# Patient Record
Sex: FEMALE | Race: WHITE | Employment: FULL TIME | URBAN - METROPOLITAN AREA
[De-identification: names, ages, dates, MRNs, and addresses within clinical notes are randomized per-mention and may not be internally consistent; named-entity substitution may affect disease eponyms.]

---

## 2017-02-02 ENCOUNTER — TELEPHONE (OUTPATIENT)
Dept: FAMILY MEDICINE CLINIC | Age: 52
End: 2017-02-02

## 2017-06-06 RX ORDER — VENLAFAXINE HYDROCHLORIDE 150 MG/1
150 CAPSULE, EXTENDED RELEASE ORAL DAILY
Qty: 30 CAPSULE | Refills: 0 | Status: SHIPPED | OUTPATIENT
Start: 2017-06-06 | End: 2017-07-27

## 2017-07-19 ENCOUNTER — OFFICE VISIT (OUTPATIENT)
Dept: FAMILY MEDICINE CLINIC | Age: 52
End: 2017-07-19

## 2017-07-19 VITALS
WEIGHT: 236.8 LBS | SYSTOLIC BLOOD PRESSURE: 126 MMHG | HEART RATE: 102 BPM | OXYGEN SATURATION: 97 % | RESPIRATION RATE: 16 BRPM | BODY MASS INDEX: 38.06 KG/M2 | DIASTOLIC BLOOD PRESSURE: 80 MMHG | HEIGHT: 66 IN

## 2017-07-19 DIAGNOSIS — F43.21 ADJUSTMENT DISORDER WITH DEPRESSED MOOD: Primary | ICD-10-CM

## 2017-07-19 DIAGNOSIS — Z12.39 BREAST CANCER SCREENING: ICD-10-CM

## 2017-07-19 DIAGNOSIS — E66.01 MORBID OBESITY DUE TO EXCESS CALORIES (HCC): ICD-10-CM

## 2017-07-19 PROCEDURE — 99202 OFFICE O/P NEW SF 15 MIN: CPT | Performed by: FAMILY MEDICINE

## 2017-07-19 RX ORDER — VENLAFAXINE HYDROCHLORIDE 150 MG/1
150 CAPSULE, EXTENDED RELEASE ORAL DAILY
Qty: 30 CAPSULE | Refills: 5 | Status: SHIPPED | OUTPATIENT
Start: 2017-07-19 | End: 2018-04-13 | Stop reason: SDUPTHER

## 2017-07-19 RX ORDER — VENLAFAXINE HYDROCHLORIDE 150 MG/1
150 CAPSULE, EXTENDED RELEASE ORAL DAILY
Qty: 30 CAPSULE | Refills: 0 | OUTPATIENT
Start: 2017-07-19

## 2017-07-19 RX ORDER — VENLAFAXINE HYDROCHLORIDE 75 MG/1
150 CAPSULE, EXTENDED RELEASE ORAL DAILY
Qty: 30 CAPSULE | Refills: 5 | Status: SHIPPED | OUTPATIENT
Start: 2017-07-19 | End: 2018-04-11 | Stop reason: SDUPTHER

## 2017-07-19 ASSESSMENT — PATIENT HEALTH QUESTIONNAIRE - PHQ9
SUM OF ALL RESPONSES TO PHQ QUESTIONS 1-9: 0
2. FEELING DOWN, DEPRESSED OR HOPELESS: 0
1. LITTLE INTEREST OR PLEASURE IN DOING THINGS: 0
SUM OF ALL RESPONSES TO PHQ9 QUESTIONS 1 & 2: 0

## 2017-07-24 RX ORDER — VENLAFAXINE HYDROCHLORIDE 75 MG/1
225 CAPSULE, EXTENDED RELEASE ORAL DAILY
Qty: 90 CAPSULE | Refills: 3 | Status: SHIPPED | OUTPATIENT
Start: 2017-07-24 | End: 2017-07-27

## 2017-07-27 ENCOUNTER — OFFICE VISIT (OUTPATIENT)
Dept: FAMILY MEDICINE CLINIC | Age: 52
End: 2017-07-27

## 2017-07-27 VITALS
HEART RATE: 102 BPM | OXYGEN SATURATION: 97 % | SYSTOLIC BLOOD PRESSURE: 130 MMHG | BODY MASS INDEX: 37.99 KG/M2 | DIASTOLIC BLOOD PRESSURE: 68 MMHG | WEIGHT: 236.4 LBS

## 2017-07-27 DIAGNOSIS — R29.2 HYPOREFLEXIA: ICD-10-CM

## 2017-07-27 DIAGNOSIS — R25.2 FOOT SPASMS: Primary | ICD-10-CM

## 2017-07-27 PROCEDURE — 99213 OFFICE O/P EST LOW 20 MIN: CPT | Performed by: NURSE PRACTITIONER

## 2017-07-27 ASSESSMENT — ENCOUNTER SYMPTOMS
DOUBLE VISION: 0
SHORTNESS OF BREATH: 0
BACK PAIN: 0
BLURRED VISION: 0

## 2017-09-05 LAB
ALBUMIN SERPL-MCNC: 3.9 G/DL
ALP BLD-CCNC: 94 U/L
ALT SERPL-CCNC: 29 U/L
ANION GAP SERPL CALCULATED.3IONS-SCNC: NORMAL MMOL/L
AST SERPL-CCNC: 20 U/L
BASOPHILS ABSOLUTE: 24 /ΜL
BASOPHILS RELATIVE PERCENT: 0.3 %
BILIRUB SERPL-MCNC: 0.4 MG/DL (ref 0.1–1.4)
BUN BLDV-MCNC: 15 MG/DL
CALCIUM SERPL-MCNC: 8.7 MG/DL
CHLORIDE BLD-SCNC: 104 MMOL/L
CO2: 25 MMOL/L
CREAT SERPL-MCNC: 0.78 MG/DL
EOSINOPHILS ABSOLUTE: 0 /ΜL
EOSINOPHILS RELATIVE PERCENT: 0 %
GFR CALCULATED: NORMAL
GLUCOSE BLD-MCNC: 153 MG/DL
HCT VFR BLD CALC: 39.3 % (ref 36–46)
HEMOGLOBIN: 13.4 G/DL (ref 12–16)
LYMPHOCYTES ABSOLUTE: 1536 /ΜL
LYMPHOCYTES RELATIVE PERCENT: 19.2 %
MAGNESIUM: 2 MG/DL
MCH RBC QN AUTO: 30.1 PG
MCHC RBC AUTO-ENTMCNC: 34.2 G/DL
MCV RBC AUTO: 88 FL
MONOCYTES ABSOLUTE: 464 /ΜL
MONOCYTES RELATIVE PERCENT: 5.8 %
NEUTROPHILS ABSOLUTE: 5976 /ΜL
NEUTROPHILS RELATIVE PERCENT: 74.7 %
PLATELET # BLD: 271 K/ΜL
PMV BLD AUTO: 8.5 FL
POTASSIUM SERPL-SCNC: 4.2 MMOL/L
RBC # BLD: 4.47 10^6/ΜL
SODIUM BLD-SCNC: 139 MMOL/L
TOTAL PROTEIN: 6.5
VITAMIN B-12: 483
WBC # BLD: 8 10^3/ML

## 2017-09-07 DIAGNOSIS — R73.9 ELEVATED BLOOD SUGAR: Primary | ICD-10-CM

## 2017-09-08 DIAGNOSIS — R73.9 ELEVATED BLOOD SUGAR: Primary | ICD-10-CM

## 2018-04-02 ENCOUNTER — TELEPHONE (OUTPATIENT)
Dept: FAMILY MEDICINE CLINIC | Age: 53
End: 2018-04-02

## 2018-04-02 DIAGNOSIS — Z00.00 PHYSICAL EXAM, ROUTINE: Primary | ICD-10-CM

## 2018-04-06 DIAGNOSIS — Z00.00 PHYSICAL EXAM, ROUTINE: ICD-10-CM

## 2018-04-06 LAB
A/G RATIO: 1.9 (ref 1.1–2.2)
ALBUMIN SERPL-MCNC: 4.3 G/DL (ref 3.4–5)
ALP BLD-CCNC: 102 U/L (ref 40–129)
ALT SERPL-CCNC: 17 U/L (ref 10–40)
ANION GAP SERPL CALCULATED.3IONS-SCNC: 14 MMOL/L (ref 3–16)
AST SERPL-CCNC: 15 U/L (ref 15–37)
BASOPHILS ABSOLUTE: 0.1 K/UL (ref 0–0.2)
BASOPHILS RELATIVE PERCENT: 0.8 %
BILIRUB SERPL-MCNC: 0.3 MG/DL (ref 0–1)
BUN BLDV-MCNC: 11 MG/DL (ref 7–20)
CALCIUM SERPL-MCNC: 8.4 MG/DL (ref 8.3–10.6)
CHLORIDE BLD-SCNC: 103 MMOL/L (ref 99–110)
CHOLESTEROL, TOTAL: 231 MG/DL (ref 0–199)
CO2: 25 MMOL/L (ref 21–32)
CREAT SERPL-MCNC: 0.7 MG/DL (ref 0.6–1.1)
EOSINOPHILS ABSOLUTE: 0.1 K/UL (ref 0–0.6)
EOSINOPHILS RELATIVE PERCENT: 1.4 %
GFR AFRICAN AMERICAN: >60
GFR NON-AFRICAN AMERICAN: >60
GLOBULIN: 2.3 G/DL
GLUCOSE BLD-MCNC: 167 MG/DL (ref 70–99)
HCT VFR BLD CALC: 40.6 % (ref 36–48)
HDLC SERPL-MCNC: 44 MG/DL (ref 40–60)
HEMOGLOBIN: 13.5 G/DL (ref 12–16)
LDL CHOLESTEROL CALCULATED: 149 MG/DL
LYMPHOCYTES ABSOLUTE: 1.7 K/UL (ref 1–5.1)
LYMPHOCYTES RELATIVE PERCENT: 27 %
MCH RBC QN AUTO: 30.1 PG (ref 26–34)
MCHC RBC AUTO-ENTMCNC: 33.3 G/DL (ref 31–36)
MCV RBC AUTO: 90.6 FL (ref 80–100)
MONOCYTES ABSOLUTE: 0.5 K/UL (ref 0–1.3)
MONOCYTES RELATIVE PERCENT: 7.2 %
NEUTROPHILS ABSOLUTE: 4 K/UL (ref 1.7–7.7)
NEUTROPHILS RELATIVE PERCENT: 63.6 %
PDW BLD-RTO: 13.3 % (ref 12.4–15.4)
PLATELET # BLD: 312 K/UL (ref 135–450)
PMV BLD AUTO: 9.3 FL (ref 5–10.5)
POTASSIUM SERPL-SCNC: 4.8 MMOL/L (ref 3.5–5.1)
RBC # BLD: 4.48 M/UL (ref 4–5.2)
SODIUM BLD-SCNC: 142 MMOL/L (ref 136–145)
TOTAL PROTEIN: 6.6 G/DL (ref 6.4–8.2)
TRIGL SERPL-MCNC: 190 MG/DL (ref 0–150)
TSH REFLEX: 2.8 UIU/ML (ref 0.27–4.2)
VLDLC SERPL CALC-MCNC: 38 MG/DL
WBC # BLD: 6.3 K/UL (ref 4–11)

## 2018-04-07 LAB
ESTIMATED AVERAGE GLUCOSE: 145.6 MG/DL
HBA1C MFR BLD: 6.7 %

## 2018-04-11 ENCOUNTER — OFFICE VISIT (OUTPATIENT)
Dept: FAMILY MEDICINE CLINIC | Age: 53
End: 2018-04-11

## 2018-04-11 VITALS
WEIGHT: 244.2 LBS | OXYGEN SATURATION: 96 % | BODY MASS INDEX: 39.25 KG/M2 | SYSTOLIC BLOOD PRESSURE: 122 MMHG | HEART RATE: 101 BPM | DIASTOLIC BLOOD PRESSURE: 80 MMHG | RESPIRATION RATE: 16 BRPM

## 2018-04-11 DIAGNOSIS — E78.2 HYPERLIPIDEMIA, MIXED: ICD-10-CM

## 2018-04-11 DIAGNOSIS — E11.9 NEW ONSET TYPE 2 DIABETES MELLITUS (HCC): ICD-10-CM

## 2018-04-11 DIAGNOSIS — Z12.39 SCREENING FOR BREAST CANCER: ICD-10-CM

## 2018-04-11 DIAGNOSIS — Z00.00 PHYSICAL EXAM: Primary | ICD-10-CM

## 2018-04-11 DIAGNOSIS — E04.1 THYROID NODULE: ICD-10-CM

## 2018-04-11 DIAGNOSIS — Z12.11 SCREENING FOR COLON CANCER: ICD-10-CM

## 2018-04-11 LAB
BILIRUBIN, POC: NORMAL
BLOOD URINE, POC: NORMAL
CLARITY, POC: NORMAL
COLOR, POC: NORMAL
CREATININE URINE POCT: NORMAL
GLUCOSE URINE, POC: NORMAL
KETONES, POC: NORMAL
LEUKOCYTE EST, POC: NORMAL
MICROALBUMIN/CREAT 24H UR: NORMAL MG/G{CREAT}
MICROALBUMIN/CREAT UR-RTO: <30
NITRITE, POC: NORMAL
PH, POC: 5
PROTEIN, POC: NORMAL
SPECIFIC GRAVITY, POC: 1.03
UROBILINOGEN, POC: 0.2

## 2018-04-11 PROCEDURE — 82044 UR ALBUMIN SEMIQUANTITATIVE: CPT | Performed by: NURSE PRACTITIONER

## 2018-04-11 PROCEDURE — 99396 PREV VISIT EST AGE 40-64: CPT | Performed by: NURSE PRACTITIONER

## 2018-04-11 PROCEDURE — 81002 URINALYSIS NONAUTO W/O SCOPE: CPT | Performed by: NURSE PRACTITIONER

## 2018-04-11 RX ORDER — ASPIRIN 81 MG/1
81 TABLET ORAL DAILY
Qty: 90 TABLET | Refills: 3 | Status: SHIPPED | OUTPATIENT
Start: 2018-04-11 | End: 2022-08-18

## 2018-04-11 RX ORDER — PRAVASTATIN SODIUM 40 MG
40 TABLET ORAL EVERY EVENING
Qty: 30 TABLET | Refills: 5 | Status: SHIPPED | OUTPATIENT
Start: 2018-04-11 | End: 2018-08-07 | Stop reason: SDUPTHER

## 2018-04-11 RX ORDER — VENLAFAXINE HYDROCHLORIDE 75 MG/1
75 CAPSULE, EXTENDED RELEASE ORAL DAILY
Qty: 30 CAPSULE | Refills: 5 | COMMUNITY
Start: 2018-04-11 | End: 2018-04-13 | Stop reason: SDUPTHER

## 2018-04-13 DIAGNOSIS — F43.21 ADJUSTMENT DISORDER WITH DEPRESSED MOOD: ICD-10-CM

## 2018-04-13 RX ORDER — VENLAFAXINE HYDROCHLORIDE 75 MG/1
75 CAPSULE, EXTENDED RELEASE ORAL DAILY
Qty: 30 CAPSULE | Refills: 5 | Status: SHIPPED | OUTPATIENT
Start: 2018-04-13 | End: 2018-09-15

## 2018-04-13 RX ORDER — VENLAFAXINE HYDROCHLORIDE 150 MG/1
150 CAPSULE, EXTENDED RELEASE ORAL DAILY
Qty: 30 CAPSULE | Refills: 5 | Status: SHIPPED | OUTPATIENT
Start: 2018-04-13 | End: 2018-06-30 | Stop reason: SDUPTHER

## 2018-05-01 DIAGNOSIS — E11.9 NEW ONSET TYPE 2 DIABETES MELLITUS (HCC): Primary | ICD-10-CM

## 2018-05-01 DIAGNOSIS — E78.2 HYPERLIPIDEMIA, MIXED: ICD-10-CM

## 2018-06-25 DIAGNOSIS — E78.2 HYPERLIPIDEMIA, MIXED: ICD-10-CM

## 2018-06-25 DIAGNOSIS — E11.9 NEW ONSET TYPE 2 DIABETES MELLITUS (HCC): ICD-10-CM

## 2018-06-25 LAB
A/G RATIO: 1.8 (ref 1.1–2.2)
ALBUMIN SERPL-MCNC: 4.4 G/DL (ref 3.4–5)
ALP BLD-CCNC: 99 U/L (ref 40–129)
ALT SERPL-CCNC: 16 U/L (ref 10–40)
ANION GAP SERPL CALCULATED.3IONS-SCNC: 20 MMOL/L (ref 3–16)
AST SERPL-CCNC: 15 U/L (ref 15–37)
BILIRUB SERPL-MCNC: 0.5 MG/DL (ref 0–1)
BUN BLDV-MCNC: 14 MG/DL (ref 7–20)
CALCIUM SERPL-MCNC: 8.8 MG/DL (ref 8.3–10.6)
CHLORIDE BLD-SCNC: 99 MMOL/L (ref 99–110)
CHOLESTEROL, FASTING: 177 MG/DL (ref 0–199)
CO2: 23 MMOL/L (ref 21–32)
CREAT SERPL-MCNC: 0.7 MG/DL (ref 0.6–1.1)
GFR AFRICAN AMERICAN: >60
GFR NON-AFRICAN AMERICAN: >60
GLOBULIN: 2.4 G/DL
GLUCOSE FASTING: 135 MG/DL (ref 70–99)
HDLC SERPL-MCNC: 43 MG/DL (ref 40–60)
LDL CHOLESTEROL CALCULATED: 97 MG/DL
POTASSIUM SERPL-SCNC: 4.4 MMOL/L (ref 3.5–5.1)
SODIUM BLD-SCNC: 142 MMOL/L (ref 136–145)
TOTAL PROTEIN: 6.8 G/DL (ref 6.4–8.2)
TRIGLYCERIDE, FASTING: 186 MG/DL (ref 0–150)
VLDLC SERPL CALC-MCNC: 37 MG/DL

## 2018-06-26 LAB
ESTIMATED AVERAGE GLUCOSE: 128.4 MG/DL
HBA1C MFR BLD: 6.1 %

## 2018-06-28 ENCOUNTER — OFFICE VISIT (OUTPATIENT)
Dept: FAMILY MEDICINE CLINIC | Age: 53
End: 2018-06-28

## 2018-06-28 ENCOUNTER — PATIENT MESSAGE (OUTPATIENT)
Dept: FAMILY MEDICINE CLINIC | Age: 53
End: 2018-06-28

## 2018-06-28 VITALS
RESPIRATION RATE: 16 BRPM | OXYGEN SATURATION: 97 % | HEART RATE: 107 BPM | DIASTOLIC BLOOD PRESSURE: 80 MMHG | BODY MASS INDEX: 37.61 KG/M2 | WEIGHT: 234 LBS | SYSTOLIC BLOOD PRESSURE: 102 MMHG | HEIGHT: 66 IN

## 2018-06-28 DIAGNOSIS — Z12.11 SCREENING FOR COLON CANCER: ICD-10-CM

## 2018-06-28 DIAGNOSIS — E55.9 VITAMIN D DEFICIENCY: ICD-10-CM

## 2018-06-28 DIAGNOSIS — R51.9 NONINTRACTABLE EPISODIC HEADACHE, UNSPECIFIED HEADACHE TYPE: ICD-10-CM

## 2018-06-28 DIAGNOSIS — E78.2 HYPERLIPIDEMIA, MIXED: ICD-10-CM

## 2018-06-28 DIAGNOSIS — Z12.39 SCREENING FOR BREAST CANCER: ICD-10-CM

## 2018-06-28 DIAGNOSIS — E11.9 NEW ONSET TYPE 2 DIABETES MELLITUS (HCC): Primary | ICD-10-CM

## 2018-06-28 PROCEDURE — 3044F HG A1C LEVEL LT 7.0%: CPT | Performed by: NURSE PRACTITIONER

## 2018-06-28 PROCEDURE — 3017F COLORECTAL CA SCREEN DOC REV: CPT | Performed by: NURSE PRACTITIONER

## 2018-06-28 PROCEDURE — G8427 DOCREV CUR MEDS BY ELIG CLIN: HCPCS | Performed by: NURSE PRACTITIONER

## 2018-06-28 PROCEDURE — 2022F DILAT RTA XM EVC RTNOPTHY: CPT | Performed by: NURSE PRACTITIONER

## 2018-06-28 PROCEDURE — 1036F TOBACCO NON-USER: CPT | Performed by: NURSE PRACTITIONER

## 2018-06-28 PROCEDURE — 99214 OFFICE O/P EST MOD 30 MIN: CPT | Performed by: NURSE PRACTITIONER

## 2018-06-28 PROCEDURE — G8417 CALC BMI ABV UP PARAM F/U: HCPCS | Performed by: NURSE PRACTITIONER

## 2018-06-28 RX ORDER — METFORMIN HYDROCHLORIDE 500 MG/1
1000 TABLET, EXTENDED RELEASE ORAL
Qty: 60 TABLET | Refills: 5 | Status: SHIPPED | OUTPATIENT
Start: 2018-06-28 | End: 2019-01-04 | Stop reason: SDUPTHER

## 2018-06-28 ASSESSMENT — ENCOUNTER SYMPTOMS
VOMITING: 0
DIARRHEA: 0
ORTHOPNEA: 0
NAUSEA: 0
ABDOMINAL PAIN: 0
SHORTNESS OF BREATH: 0

## 2018-06-28 ASSESSMENT — PATIENT HEALTH QUESTIONNAIRE - PHQ9
1. LITTLE INTEREST OR PLEASURE IN DOING THINGS: 0
SUM OF ALL RESPONSES TO PHQ QUESTIONS 1-9: 0
SUM OF ALL RESPONSES TO PHQ9 QUESTIONS 1 & 2: 0
2. FEELING DOWN, DEPRESSED OR HOPELESS: 0

## 2018-06-29 RX ORDER — BLOOD-GLUCOSE METER
EACH MISCELLANEOUS
Qty: 1 KIT | Refills: 0 | Status: SHIPPED | OUTPATIENT
Start: 2018-06-29

## 2018-06-30 DIAGNOSIS — F43.21 ADJUSTMENT DISORDER WITH DEPRESSED MOOD: ICD-10-CM

## 2018-07-02 RX ORDER — VENLAFAXINE HYDROCHLORIDE 150 MG/1
150 CAPSULE, EXTENDED RELEASE ORAL DAILY
Qty: 30 CAPSULE | Refills: 1 | Status: SHIPPED | OUTPATIENT
Start: 2018-07-02 | End: 2018-09-14 | Stop reason: SDUPTHER

## 2018-08-07 RX ORDER — PRAVASTATIN SODIUM 40 MG
40 TABLET ORAL EVERY EVENING
Qty: 30 TABLET | Refills: 7 | Status: SHIPPED | OUTPATIENT
Start: 2018-08-07 | End: 2020-11-04

## 2019-01-04 DIAGNOSIS — F43.21 ADJUSTMENT DISORDER WITH DEPRESSED MOOD: ICD-10-CM

## 2019-01-04 DIAGNOSIS — E11.9 NEW ONSET TYPE 2 DIABETES MELLITUS (HCC): ICD-10-CM

## 2019-01-04 RX ORDER — METFORMIN HYDROCHLORIDE 500 MG/1
1000 TABLET, EXTENDED RELEASE ORAL
Qty: 60 TABLET | Refills: 0 | Status: SHIPPED | OUTPATIENT
Start: 2019-01-04 | End: 2020-11-04 | Stop reason: SDUPTHER

## 2019-01-04 RX ORDER — VENLAFAXINE HYDROCHLORIDE 150 MG/1
CAPSULE, EXTENDED RELEASE ORAL
Qty: 30 CAPSULE | Refills: 0 | Status: SHIPPED | OUTPATIENT
Start: 2019-01-04 | End: 2022-02-01

## 2019-01-04 RX ORDER — VENLAFAXINE HYDROCHLORIDE 75 MG/1
75 CAPSULE, EXTENDED RELEASE ORAL DAILY
Qty: 90 CAPSULE | Refills: 0 | OUTPATIENT
Start: 2019-01-04

## 2019-01-07 DIAGNOSIS — F43.21 ADJUSTMENT DISORDER WITH DEPRESSED MOOD: ICD-10-CM

## 2019-01-07 DIAGNOSIS — E11.9 NEW ONSET TYPE 2 DIABETES MELLITUS (HCC): ICD-10-CM

## 2019-01-07 RX ORDER — VENLAFAXINE HYDROCHLORIDE 150 MG/1
CAPSULE, EXTENDED RELEASE ORAL
Qty: 90 CAPSULE | Refills: 1 | OUTPATIENT
Start: 2019-01-07

## 2019-01-07 RX ORDER — METFORMIN HYDROCHLORIDE 500 MG/1
1000 TABLET, EXTENDED RELEASE ORAL
Qty: 180 TABLET | Refills: 0 | OUTPATIENT
Start: 2019-01-07 | End: 2019-02-06

## 2019-01-09 DIAGNOSIS — F43.21 ADJUSTMENT DISORDER WITH DEPRESSED MOOD: ICD-10-CM

## 2019-01-09 DIAGNOSIS — E11.9 NEW ONSET TYPE 2 DIABETES MELLITUS (HCC): ICD-10-CM

## 2019-01-09 RX ORDER — VENLAFAXINE HYDROCHLORIDE 75 MG/1
CAPSULE, EXTENDED RELEASE ORAL
Qty: 30 CAPSULE | Refills: 0 | OUTPATIENT
Start: 2019-01-09

## 2019-01-09 RX ORDER — VENLAFAXINE HYDROCHLORIDE 150 MG/1
CAPSULE, EXTENDED RELEASE ORAL
Qty: 30 CAPSULE | Refills: 0 | OUTPATIENT
Start: 2019-01-09

## 2019-01-09 RX ORDER — METFORMIN HYDROCHLORIDE 500 MG/1
1000 TABLET, EXTENDED RELEASE ORAL
Qty: 60 TABLET | Refills: 0 | OUTPATIENT
Start: 2019-01-09 | End: 2019-02-08

## 2019-01-16 RX ORDER — BLOOD-GLUCOSE METER
EACH MISCELLANEOUS
Qty: 1 KIT | Refills: 0 | OUTPATIENT
Start: 2019-01-16

## 2019-05-16 NOTE — TELEPHONE ENCOUNTER
Medication and Quantity requested:    blood glucose test strips (ACCU-CHEK JUSTO) strip - 100      [facility]  [facility] and phone number updated in EPIC:  yes

## 2019-05-20 RX ORDER — LANCETS 33 GAUGE
1 EACH MISCELLANEOUS DAILY
Qty: 100 EACH | Refills: 3 | Status: SHIPPED | OUTPATIENT
Start: 2019-05-20 | End: 2020-11-04 | Stop reason: SDUPTHER

## 2019-05-20 RX ORDER — BLOOD-GLUCOSE METER
1 EACH MISCELLANEOUS DAILY
Qty: 1 KIT | Refills: 0 | Status: SHIPPED | OUTPATIENT
Start: 2019-05-20

## 2020-08-03 RX ORDER — BLOOD SUGAR DIAGNOSTIC
STRIP MISCELLANEOUS
Qty: 100 STRIP | Refills: 3 | OUTPATIENT
Start: 2020-08-03

## 2020-08-03 NOTE — TELEPHONE ENCOUNTER
Medication:   Requested Prescriptions     Pending Prescriptions Disp Refills    ONETOUCH ULTRA strip [Pharmacy Med Name: ONE TOUCH ULTRA BLUE TEST STRP] 100 strip 3     Sig: TESTS ONCE DAILY       Last Filled:  No longer at this practice see refill 12/4/19    Patient Phone Number: [phone] (home) [phone] (work)    Last appt: 6/28/2018   Next appt: Visit date not found    Last Labs DM:   Lab Results   Component Value Date    LABA1C 6.1 06/25/2018

## 2020-11-03 NOTE — PROGRESS NOTES
Chief Complaint:   Katiuska Feliz is a 50 y.o. female who presents to [facility], for limited examination and DM check and med refill    Moved out of town the last couple years and is now back in [facility] to establish with me    Had not been seen since 2018, was doctoring with physician [place] state    Due for fasting labs, immunizations, foot exam, urine micral etc.  She will return for GYN exam and Pap smear    Needs meds refilled today  Talk to her about her weight    Left shoulder pain for 3 weeks, denies trauma or injury or fall    History of Present Illness:    Meds, vitamins and allergies reviewed with pt  Labs pending     Wt Readings from Last 3 Encounters:   11/04/20 235 lb (106.6 kg)   06/28/18 234 lb (106.1 kg)   04/11/18 244 lb 3.2 oz (110.8 kg)       Patient Active Problem List   Diagnosis    Internal hemorrhoid    Adjustment disorder with depressed mood    Vitamin D deficiency    Type 2 diabetes mellitus without complication, without long-term current use of insulin ([address]    Hyperlipidemia, mixed    Thyroid nodule    Primary insomnia     Past Medical History:   Diagnosis Date    Depression     Hyperlipidemia, mixed 4/11/2018    Internal hemorrhoid     New onset type 2 diabetes mellitus ([address] 4/11/2018    Thyroid nodule        Past Surgical History:   Procedure Laterality Date    ENDOMETRIAL ABLATION         Current Outpatient Medications   Medication Sig Dispense Refill    QUEtiapine (SEROQUEL) 25 MG tablet Take 25 mg by mouth 2 times daily      metFORMIN (GLUCOPHAGE XR) 500 MG extended release tablet Take 1 tablet by mouth 2 times daily (with meals) 180 tablet 1    venlafaxine (EFFEXOR XR) 75 MG extended release capsule TAKE 1 CAPSULE BY MOUTH DAILY with dinner 90 capsule 1    QUEtiapine (SEROQUEL) 50 MG tablet Take 1 tablet by mouth nightly 90 tablet 1    venlafaxine (EFFEXOR XR) 37.5 MG extended release capsule Take 1 capsule by mouth Daily with supper 90 capsule 1    OneTouch Delica Lancets 43L MISC 1 each by Does not apply route daily 100 each 3    blood glucose test strips (ASCENSIA AUTODISC VI;ONE TOUCH ULTRA TEST VI) strip 1 each by In Vitro route daily As needed. 100 each 3    Blood Glucose Monitoring Suppl ([employer] BLOOD GLUCOSE METER) w/Device KIT 1 each by Does not apply route daily NOTE PHARMACY: ONE TOUCH ULTRA METER 1 kit 0    blood glucose test strips (ACCU-CHEK JUSTO) strip 1 each by In Vitro route daily As needed. 100 each 3    venlafaxine (EFFEXOR XR) 150 MG extended release capsule TAKE 1 CAPSULE BY MOUTH EVERY DAY 30 capsule 0    Blood Glucose Monitoring Suppl (ACCU-CHEK JUSTO PLUS) w/Device KIT Tid 1 kit 0    multivitamin (THERAGRAN) per tablet Take 1 tablet by mouth daily.  aspirin EC 81 MG EC tablet Take 1 tablet by mouth daily 90 tablet 3     No current facility-administered medications for this visit.       No Known Allergies    Social History     Socioeconomic History    Marital status:      Spouse name: None    Number of children: None    Years of education: None    Highest education level: None   Occupational History    None   Social Needs    Financial resource strain: None    Food insecurity     Worry: None     Inability: None    Transportation needs     Medical: None     Non-medical: None   Tobacco Use    Smoking status: Former Smoker     Packs/day: 1.00     Types: Cigarettes     Start date: 4/1/1980     Last attempt to quit: 8/19/2005     Years since quitting: 15.2    Smokeless tobacco: Never Used   Substance and Sexual Activity    Alcohol use: Yes     Comment: social    Drug use: No    Sexual activity: None   Lifestyle    Physical activity     Days per week: None     Minutes per session: None    Stress: None   Relationships    Social connections     Talks on phone: None     Gets together: None     Attends Rastafari service: None     Active member of club or organization: None     Attends meetings of clubs or organizations: None     Relationship status: None    Intimate partner violence     Fear of current or ex partner: None     Emotionally abused: None     Physically abused: None     Forced sexual activity: None   Other Topics Concern    None   Social History Narrative    None     Family History   Problem Relation Age of Onset    High Cholesterol Mother     Prostate Cancer Father         mets to aorta     Rheum Arthritis Sister                              REVIEW OF SYSTEMS:   CONSTITUTIONAL: No major weight gain or loss, fatigue, weakness, night sweats or fever. HEENT: No new vision difficulties or ringing in the ears. RESPIRATORY: No new SOB, PND, orthopnea or cough. CARDIOVASCULAR: no CP, palpitations or SOB with exertion  GI: No nausea, vomiting, diarrhea, constipation, abdominal pain or changes in bowel habits. : No urinary frequency, urgency, incontinence hematuria or dysuria. SKIN: No cyanosis or skin lesions. MUSCULOSKELETAL: 3 weeks of left shoulder discomfort, no injury  NEUROLOGICAL: No syncope or TIA-like symptoms. PSYCHIATRIC: No anxiety, insomnia or depression     PHYSICAL EXAMINATION:  /82   Pulse 109   Temp 97.4 °F (36.3 °C)   Ht 5' 5\" (1.651 m)   Wt 235 lb (106.6 kg)   SpO2 98%   BMI 39.11 kg/m²   General appearance:  alert, no distress, BMI= noted   Skin: Skin color, texture, turgor normal. No rashes or lesions. Head: Normocephalic. No masses, lesions, tenderness or abnormalities  Eyes: conjunctivae/corneas clear. PERRL, EOM's intact. Ears: External ears normal. Canals clear. TM's clear bilaterally. Nose/Sinuses: Not examined due to Covid/mask on  Oropharynx: Not examined due to Covid/mask on  Neck: Neck supple, and symmetric. No adenopathy. Thyroid symmetric, normal size, without nodule. Trachea is midline. No bruits. Back: Back symmetric, no curvature. ROM normal. No CVA tenderness. Lungs: Lungs clear to auscultation bilaterally.   No retractions or use of accessory muscles. Heart: Regular rate and rhythm, with no rub, murmur or gallop noted. Breasts: not examined   Abdomen: Abdomen soft, non-tender, obese, BS normal. No masses, organomegaly. No hernia noted. Extremities: Extremities normal. No deformities, edema, or skin discoloration. No cyanosis or clubbing noted to the nails. Lymph: No lymphadenopathy of the neck or supraclavicular regions. Musculoskeletal: Spine ROM normal. Muscular strength intact. Peripheral pulses: radial=4/4, femoral=4/4, dorsalis pedis=4/4,  Neuro: Cranial nerves intact, Gait normal. Reflexes normal and symmetric, with no pathologic reflex noted. No focal weakness. Normal sensation to light touch. Pelvic/Rectal: Exam deferred to OB/GYN    Urine microalbumin noted    Labs pending    ASSESSMENT/PLAN:  1. Type 2 diabetes mellitus without complication, without long-term current use of insulin (HCC)  Healthy diet, stay active  - CBC Auto Differential; Future  - Comprehensive Metabolic Panel, Fasting; Future  - Hemoglobin A1C; Future  - Lipid Panel; Future  - TSH with Reflex; Future  - Diabetic Foot Exam  - POCT microalbumin    2. Hyperlipidemia, mixed  Healthy diet, screen   - Comprehensive Metabolic Panel, Fasting; Future  - Lipid Panel; Future    3. Adjustment disorder with depressed mood  Stable, continue Effexor and Seroquel at night    4. Primary insomnia  Seroquel at bedtime    5. Breast cancer screening by mammogram  screen  - SOCORRO DIGITAL SCREENING AUGMENTED BILATERAL; Future    6. Acute pain of left shoulder  Refer to Pt  - [facility]    7.  BMI 39.0-39.9,adult  Healthy  diet,  regular daily exercise/150 min per week , avoid fast food, pop, juice and eating after dinner  Drink plenty of water daily    Follow-up for GYN exam in the future  Discussed shingles vaccine and colonoscopy in the future

## 2020-11-04 ENCOUNTER — OFFICE VISIT (OUTPATIENT)
Dept: FAMILY MEDICINE CLINIC | Age: 55
End: 2020-11-04
Payer: COMMERCIAL

## 2020-11-04 VITALS
WEIGHT: 235 LBS | OXYGEN SATURATION: 98 % | HEIGHT: 65 IN | HEART RATE: 109 BPM | SYSTOLIC BLOOD PRESSURE: 124 MMHG | BODY MASS INDEX: 39.15 KG/M2 | TEMPERATURE: 97.4 F | DIASTOLIC BLOOD PRESSURE: 82 MMHG

## 2020-11-04 DIAGNOSIS — E11.9 TYPE 2 DIABETES MELLITUS WITHOUT COMPLICATION, WITHOUT LONG-TERM CURRENT USE OF INSULIN (HCC): ICD-10-CM

## 2020-11-04 DIAGNOSIS — E78.2 HYPERLIPIDEMIA, MIXED: ICD-10-CM

## 2020-11-04 PROBLEM — F51.01 PRIMARY INSOMNIA: Status: ACTIVE | Noted: 2020-11-04

## 2020-11-04 LAB
A/G RATIO: 1.7 (ref 1.1–2.2)
ALBUMIN SERPL-MCNC: 4.2 G/DL (ref 3.4–5)
ALP BLD-CCNC: 105 U/L (ref 40–129)
ALT SERPL-CCNC: 9 U/L (ref 10–40)
ANION GAP SERPL CALCULATED.3IONS-SCNC: 9 MMOL/L (ref 3–16)
AST SERPL-CCNC: 11 U/L (ref 15–37)
BASOPHILS ABSOLUTE: 0 K/UL (ref 0–0.2)
BASOPHILS RELATIVE PERCENT: 0.6 %
BILIRUB SERPL-MCNC: 0.4 MG/DL (ref 0–1)
BUN BLDV-MCNC: 10 MG/DL (ref 7–20)
CALCIUM SERPL-MCNC: 8.9 MG/DL (ref 8.3–10.6)
CHLORIDE BLD-SCNC: 99 MMOL/L (ref 99–110)
CHOLESTEROL, TOTAL: 276 MG/DL (ref 0–199)
CO2: 23 MMOL/L (ref 21–32)
CREAT SERPL-MCNC: 0.7 MG/DL (ref 0.6–1.1)
CREATININE URINE POCT: 200
EOSINOPHILS ABSOLUTE: 0.1 K/UL (ref 0–0.6)
EOSINOPHILS RELATIVE PERCENT: 1.5 %
GFR AFRICAN AMERICAN: >60
GFR NON-AFRICAN AMERICAN: >60
GLOBULIN: 2.5 G/DL
GLUCOSE FASTING: 194 MG/DL (ref 70–99)
HCT VFR BLD CALC: 39.1 % (ref 36–48)
HDLC SERPL-MCNC: 50 MG/DL (ref 40–60)
HEMOGLOBIN: 13.3 G/DL (ref 12–16)
LDL CHOLESTEROL CALCULATED: 174 MG/DL
LYMPHOCYTES ABSOLUTE: 1.6 K/UL (ref 1–5.1)
LYMPHOCYTES RELATIVE PERCENT: 24.5 %
MCH RBC QN AUTO: 29.7 PG (ref 26–34)
MCHC RBC AUTO-ENTMCNC: 34.1 G/DL (ref 31–36)
MCV RBC AUTO: 87 FL (ref 80–100)
MICROALBUMIN/CREAT 24H UR: 30 MG/G{CREAT}
MICROALBUMIN/CREAT UR-RTO: 30
MONOCYTES ABSOLUTE: 0.3 K/UL (ref 0–1.3)
MONOCYTES RELATIVE PERCENT: 5.2 %
NEUTROPHILS ABSOLUTE: 4.3 K/UL (ref 1.7–7.7)
NEUTROPHILS RELATIVE PERCENT: 68.2 %
PDW BLD-RTO: 13.7 % (ref 12.4–15.4)
PLATELET # BLD: 284 K/UL (ref 135–450)
PMV BLD AUTO: 8.9 FL (ref 5–10.5)
POTASSIUM SERPL-SCNC: 4.5 MMOL/L (ref 3.5–5.1)
RBC # BLD: 4.49 M/UL (ref 4–5.2)
SODIUM BLD-SCNC: 131 MMOL/L (ref 136–145)
TOTAL PROTEIN: 6.7 G/DL (ref 6.4–8.2)
TRIGL SERPL-MCNC: 258 MG/DL (ref 0–150)
TSH REFLEX: 2.29 UIU/ML (ref 0.27–4.2)
VLDLC SERPL CALC-MCNC: 52 MG/DL
WBC # BLD: 6.3 K/UL (ref 4–11)

## 2020-11-04 PROCEDURE — 1036F TOBACCO NON-USER: CPT | Performed by: FAMILY MEDICINE

## 2020-11-04 PROCEDURE — 82044 UR ALBUMIN SEMIQUANTITATIVE: CPT | Performed by: FAMILY MEDICINE

## 2020-11-04 PROCEDURE — 90472 IMMUNIZATION ADMIN EACH ADD: CPT | Performed by: FAMILY MEDICINE

## 2020-11-04 PROCEDURE — 3046F HEMOGLOBIN A1C LEVEL >9.0%: CPT | Performed by: FAMILY MEDICINE

## 2020-11-04 PROCEDURE — 90686 IIV4 VACC NO PRSV 0.5 ML IM: CPT | Performed by: FAMILY MEDICINE

## 2020-11-04 PROCEDURE — 3017F COLORECTAL CA SCREEN DOC REV: CPT | Performed by: FAMILY MEDICINE

## 2020-11-04 PROCEDURE — G8417 CALC BMI ABV UP PARAM F/U: HCPCS | Performed by: FAMILY MEDICINE

## 2020-11-04 PROCEDURE — 90471 IMMUNIZATION ADMIN: CPT | Performed by: FAMILY MEDICINE

## 2020-11-04 PROCEDURE — G8482 FLU IMMUNIZE ORDER/ADMIN: HCPCS | Performed by: FAMILY MEDICINE

## 2020-11-04 PROCEDURE — 99214 OFFICE O/P EST MOD 30 MIN: CPT | Performed by: FAMILY MEDICINE

## 2020-11-04 PROCEDURE — G8427 DOCREV CUR MEDS BY ELIG CLIN: HCPCS | Performed by: FAMILY MEDICINE

## 2020-11-04 PROCEDURE — 90715 TDAP VACCINE 7 YRS/> IM: CPT | Performed by: FAMILY MEDICINE

## 2020-11-04 PROCEDURE — 2022F DILAT RTA XM EVC RTNOPTHY: CPT | Performed by: FAMILY MEDICINE

## 2020-11-04 RX ORDER — QUETIAPINE FUMARATE 50 MG/1
50 TABLET, FILM COATED ORAL NIGHTLY
Qty: 90 TABLET | Refills: 1 | Status: SHIPPED | OUTPATIENT
Start: 2020-11-04 | End: 2021-04-26

## 2020-11-04 RX ORDER — LANCETS 33 GAUGE
1 EACH MISCELLANEOUS DAILY
Qty: 100 EACH | Refills: 3 | Status: SHIPPED | OUTPATIENT
Start: 2020-11-04 | End: 2021-10-22

## 2020-11-04 RX ORDER — QUETIAPINE FUMARATE 25 MG/1
25 TABLET, FILM COATED ORAL 2 TIMES DAILY
COMMUNITY
End: 2022-02-01

## 2020-11-04 RX ORDER — VENLAFAXINE HYDROCHLORIDE 75 MG/1
CAPSULE, EXTENDED RELEASE ORAL
Qty: 90 CAPSULE | Refills: 1 | Status: SHIPPED | OUTPATIENT
Start: 2020-11-04 | End: 2021-06-02

## 2020-11-04 RX ORDER — VENLAFAXINE HYDROCHLORIDE 150 MG/1
CAPSULE, EXTENDED RELEASE ORAL
Qty: 30 CAPSULE | Refills: 0 | Status: CANCELLED | OUTPATIENT
Start: 2020-11-04

## 2020-11-04 RX ORDER — METFORMIN HYDROCHLORIDE 500 MG/1
500 TABLET, EXTENDED RELEASE ORAL 2 TIMES DAILY WITH MEALS
Qty: 180 TABLET | Refills: 1 | Status: SHIPPED | OUTPATIENT
Start: 2020-11-04 | End: 2021-04-26

## 2020-11-04 RX ORDER — VENLAFAXINE HYDROCHLORIDE 37.5 MG/1
37.5 CAPSULE, EXTENDED RELEASE ORAL
Qty: 90 CAPSULE | Refills: 1 | Status: SHIPPED | OUTPATIENT
Start: 2020-11-04 | End: 2021-04-22

## 2020-11-04 RX ORDER — QUETIAPINE FUMARATE 25 MG/1
25 TABLET, FILM COATED ORAL 2 TIMES DAILY
Qty: 60 TABLET | Refills: 0 | Status: CANCELLED | OUTPATIENT
Start: 2020-11-04

## 2020-11-04 ASSESSMENT — PATIENT HEALTH QUESTIONNAIRE - PHQ9
1. LITTLE INTEREST OR PLEASURE IN DOING THINGS: 0
SUM OF ALL RESPONSES TO PHQ QUESTIONS 1-9: 0
SUM OF ALL RESPONSES TO PHQ9 QUESTIONS 1 & 2: 0
SUM OF ALL RESPONSES TO PHQ QUESTIONS 1-9: 0
SUM OF ALL RESPONSES TO PHQ QUESTIONS 1-9: 0
2. FEELING DOWN, DEPRESSED OR HOPELESS: 0

## 2020-11-05 LAB
ESTIMATED AVERAGE GLUCOSE: 139.9 MG/DL
HBA1C MFR BLD: 6.5 %

## 2020-11-07 NOTE — PROGRESS NOTES
Los Mcintosh is a 50 y.o. female. HPI:  Due to the current coronavirus pandemic, this telephone/video visit was insisted, with patient's consent, to reduce the patient's risk of exposure to COVID-19 and provide continuity of care for an established patient. The patient was at home while the provider was either at home or at the clinic. Services were provided through a synchronous discussion over the telephone and/or video chat to substitute for in person clinic visit, and coded as such. Video visit for follow-up of labs , elevated total and LDL cholesterol, A1c 6.5    Patient agreeable to diet and exercise and repeating A1c in the future  Also agreeable to starting Crestor low-dose, milligrams 2-3 times a week and increase weekly by 1 tablet nightly      Meds, vitamins and allergies reviewed with pt    Wt Readings from Last 3 Encounters:   11/04/20 235 lb (106.6 kg)   06/28/18 234 lb (106.1 kg)   04/11/18 244 lb 3.2 oz (110.8 kg)       REVIEW OF SYSTEMS:   CONSTITUTIONAL: See history of present illness,   Weight noted   HEENT: No new vision difficulties or ringing in the ears. RESPIRATORY: No new SOB, PND, orthopnea or cough. CARDIOVASCULAR: no CP, palpitations or SOB with exertion  GI: No nausea, vomiting, diarrhea, constipation, abdominal pain or changes in bowel habits. : No urinary frequency, urgency, incontinence hematuria or dysuria. SKIN: No cyanosis or skin lesions. MUSCULOSKELETAL: No new muscle or joint pain. NEUROLOGICAL: No syncope or TIA-like symptoms. PSYCHIATRIC: No anxiety, insomnia or depression     No Known Allergies    Prior to Visit Medications    Medication Sig Taking?  Authorizing Provider   rosuvastatin (CRESTOR) 5 MG tablet Take 1 tablet by mouth nightly Yes yT Gilliland MD   QUEtiapine (SEROQUEL) 25 MG tablet Take 25 mg by mouth 2 times daily Yes Historical Provider, MD   metFORMIN (GLUCOPHAGE XR) 500 MG extended release tablet Take 1 tablet by mouth 2 times daily (with meals) Yes Sher Araiza MD   venlafaxine (EFFEXOR XR) 75 MG extended release capsule TAKE 1 CAPSULE BY MOUTH DAILY with dinner Yes Sher Araiza MD   QUEtiapine (SEROQUEL) 50 MG tablet Take 1 tablet by mouth nightly Yes Sher Araiza MD   venlafaxine (EFFEXOR XR) 37.5 MG extended release capsule Take 1 capsule by mouth Daily with supper Yes Sher Araiza MD   OneTouch Delica Lancets 61V MISC 1 each by Does not apply route daily Yes Sher Araiza MD   blood glucose test strips (ASCENSIA AUTODISC VI;ONE TOUCH ULTRA TEST VI) strip 1 each by In Vitro route daily As needed. Yes Sher Araiza MD   Blood Glucose Monitoring Suppl ([employer]S BLOOD GLUCOSE METER) w/Device KIT 1 each by Does not apply route daily NOTE PHARMACY: ONE TOUCH ULTRA METER Yes CHADWICK Warner CNP   blood glucose test strips (ACCU-CHEK JUSTO) strip 1 each by In Vitro route daily As needed. Yes CHADWICK Warner CNP   venlafaxine (EFFEXOR XR) 150 MG extended release capsule TAKE 1 CAPSULE BY MOUTH EVERY DAY Yes CHADWICK Warner CNP   Blood Glucose Monitoring Suppl (ACCU-CHEK JUSTO PLUS) w/Device KIT Tid Yes Andrzej Torrez MD   multivitamin [facility]) per tablet Take 1 tablet by mouth daily.    Yes Historical Provider, MD   aspirin EC 81 MG EC tablet Take 1 tablet by mouth daily  CHADWICK Warner CNP       Past Medical History:   Diagnosis Date    Depression     Hyperlipidemia, mixed 4/11/2018    Internal hemorrhoid     New onset type 2 diabetes mellitus ([facility] 4/11/2018    Thyroid nodule        Social History     Tobacco Use    Smoking status: Former Smoker     Packs/day: 1.00     Types: Cigarettes     Start date: 4/1/1980     Last attempt to quit: 8/19/2005     Years since quitting: 15.2    Smokeless tobacco: Never Used   Substance Use Topics    Alcohol use: Yes     Comment: social       Family History   Problem Relation Age of Onset    High Cholesterol Mother     Prostate Cancer Father         mets to aorta     Rheum Arthritis Sister        OBJECTIVE:  There were no vitals taken for this visit. GEN:  alert and pleasant , in NAD  HEENT:  NCAT, EOM intact, no facial asymmetry,   NECK:  good range of motion  RR: in NAD over video, normal respiratory rate   ABD: Soft, NT per Pt self palpation  EXT: No rash or edema observed over video  NEURO: Alert oriented to person/place/date and time , normal mood and affect, able to follow commands ,  No focal changes over video     Lab Results   Component Value Date    LABA1C 6.5 11/04/2020     Lab Results   Component Value Date    .9 11/04/2020     Lab Results   Component Value Date    CHOL 276 (H) 11/04/2020    CHOL 231 (H) 04/06/2018    CHOL 184 09/06/2011     Lab Results   Component Value Date    TRIG 258 (H) 11/04/2020    TRIG 190 (H) 04/06/2018    TRIG 160 (H) 09/06/2011     Lab Results   Component Value Date    HDL 50 11/04/2020    HDL 43 06/25/2018    HDL 44 04/06/2018     Lab Results   Component Value Date    LDLCALC 174 (H) 11/04/2020    LDLCALC 97 06/25/2018    LDLCALC 149 (H) 04/06/2018     Lab Results   Component Value Date    LABVLDL 52 11/04/2020    LABVLDL 37 06/25/2018    LABVLDL 38 04/06/2018       ASSESSMENT/PLAN:  1. Hyperlipidemia, mixed  Add on Crestor, take 2-3 times a week nightly and increase by 1 tablet weekly  - rosuvastatin (CRESTOR) 5 MG tablet; Take 1 tablet by mouth nightly  Dispense: 30 tablet; Refill: 3  - Lipid Panel; Future    2. Type 2 diabetes mellitus without complication, without long-term current use of insulin (HCC)  Add on statin and recheck labs 3 months  Patient wishes to diet and exercise and repeat A1c in the future/3 months  - rosuvastatin (CRESTOR) 5 MG tablet; Take 1 tablet by mouth nightly  Dispense: 30 tablet; Refill: 3  - Lipid Panel;  Future  - Hemoglobin A1C; Future    Healthy  diet,  regular daily exercise/150 min per week , avoid fast food, pop, juice and eating after dinner  Drink plenty of water daily

## 2020-11-09 ENCOUNTER — VIRTUAL VISIT (OUTPATIENT)
Dept: FAMILY MEDICINE CLINIC | Age: 55
End: 2020-11-09
Payer: COMMERCIAL

## 2020-11-09 PROCEDURE — G8427 DOCREV CUR MEDS BY ELIG CLIN: HCPCS | Performed by: FAMILY MEDICINE

## 2020-11-09 PROCEDURE — 3017F COLORECTAL CA SCREEN DOC REV: CPT | Performed by: FAMILY MEDICINE

## 2020-11-09 PROCEDURE — 2022F DILAT RTA XM EVC RTNOPTHY: CPT | Performed by: FAMILY MEDICINE

## 2020-11-09 PROCEDURE — 3044F HG A1C LEVEL LT 7.0%: CPT | Performed by: FAMILY MEDICINE

## 2020-11-09 PROCEDURE — 99213 OFFICE O/P EST LOW 20 MIN: CPT | Performed by: FAMILY MEDICINE

## 2020-11-09 RX ORDER — ROSUVASTATIN CALCIUM 5 MG/1
5 TABLET, COATED ORAL NIGHTLY
Qty: 30 TABLET | Refills: 3 | Status: SHIPPED | OUTPATIENT
Start: 2020-11-09 | End: 2021-01-04

## 2020-11-16 ENCOUNTER — HOSPITAL ENCOUNTER (OUTPATIENT)
Dept: PHYSICAL THERAPY | Age: 55
Setting detail: THERAPIES SERIES
Discharge: HOME | End: 2020-11-16
Payer: COMMERCIAL

## 2020-11-16 NOTE — PROGRESS NOTES
Physical Therapy  Cancellation/No-show Note  Patient Name:  Blaine Galarza  :  1965   Date:  2020  Cancelled visits to date: 1  No-shows to date: 0    Patient status for today's appointment patient:  [x]  Cancelled  (eval)  []  Rescheduled appointment  []  No-show     Reason given by patient:  []  Patient ill  []  Conflicting appointment  []  No transportation    []  Conflict with work  [x]  No reason given  []  Other:     Comments:      Phone call information:   []  Phone call made today to patient at _ time at number provided:      []  Patient answered, conversation as follows:    []  Patient did not answer, message left as follows:  [x]  Phone call not made today    Electronically signed by:  Halie Barrientos PT, DPT

## 2021-01-04 DIAGNOSIS — E11.9 TYPE 2 DIABETES MELLITUS WITHOUT COMPLICATION, WITHOUT LONG-TERM CURRENT USE OF INSULIN (HCC): ICD-10-CM

## 2021-01-04 DIAGNOSIS — E78.2 HYPERLIPIDEMIA, MIXED: ICD-10-CM

## 2021-01-04 RX ORDER — ROSUVASTATIN CALCIUM 5 MG/1
TABLET, COATED ORAL
Qty: 90 TABLET | Refills: 1 | Status: SHIPPED | OUTPATIENT
Start: 2021-01-04 | End: 2021-07-12

## 2021-01-04 NOTE — TELEPHONE ENCOUNTER
Medication:   Requested Prescriptions     Pending Prescriptions Disp Refills    rosuvastatin (CRESTOR) 5 MG tablet [Pharmacy Med Name: ROSUVASTATIN CALCIUM 5 MG TAB] 30 tablet 3     Sig: TAKE 1 TABLET BY MOUTH EVERY DAY AT NIGHT       Last Filled:  11/09/2020 #30 3rf     Patient Phone Number: [phone] (home) [phone] (work)    Last appt: 11/9/2020   Next appt: Visit date not found    Last Lipid:   Lab Results   Component Value Date    CHOL 276 11/04/2020    TRIG 258 11/04/2020    HDL 50 11/04/2020    HDL 52 09/06/2011    [address] 11/04/2020

## 2021-04-22 RX ORDER — VENLAFAXINE HYDROCHLORIDE 37.5 MG/1
37.5 CAPSULE, EXTENDED RELEASE ORAL
Qty: 90 CAPSULE | Refills: 1 | Status: SHIPPED | OUTPATIENT
Start: 2021-04-22 | End: 2021-10-20

## 2021-04-22 NOTE — TELEPHONE ENCOUNTER
Remind patient to recheck fasting cholesterol and A1c, orders in epic    Follow-up with Dr. Kya Heath in the next month

## 2021-04-22 NOTE — TELEPHONE ENCOUNTER
Pt informed and will get labs done and will call back to schedule appointment.   Please send Rx to pharmacy for pt

## 2021-04-22 NOTE — TELEPHONE ENCOUNTER
Medication:   Requested Prescriptions     Pending Prescriptions Disp Refills    venlafaxine (EFFEXOR XR) 37.5 MG extended release capsule [Pharmacy Med Name: VENLAFAXINE HCL ER 37.5 MG CAP] 90 capsule 1     Sig: TAKE 1 CAPSULE BY MOUTH DAILY WITH SUPPER        Last Filled:  11/04/2020 #90 1rf     Patient Phone Number: [phone] (home) [phone] (work)    Last appt: 11/9/2020 VV  Next appt: Visit date not found    Last OARRS: No flowsheet data found.

## 2021-04-24 DIAGNOSIS — E11.9 TYPE 2 DIABETES MELLITUS WITHOUT COMPLICATIONS (HCC): ICD-10-CM

## 2021-04-26 RX ORDER — QUETIAPINE FUMARATE 50 MG/1
TABLET, FILM COATED ORAL
Qty: 90 TABLET | Refills: 1 | Status: SHIPPED | OUTPATIENT
Start: 2021-04-26 | End: 2021-10-22

## 2021-04-26 RX ORDER — METFORMIN HYDROCHLORIDE 500 MG/1
TABLET, EXTENDED RELEASE ORAL
Qty: 180 TABLET | Refills: 1 | Status: SHIPPED | OUTPATIENT
Start: 2021-04-26 | End: 2021-10-22

## 2021-06-02 RX ORDER — VENLAFAXINE HYDROCHLORIDE 75 MG/1
CAPSULE, EXTENDED RELEASE ORAL
Qty: 90 CAPSULE | Refills: 1 | Status: SHIPPED | OUTPATIENT
Start: 2021-06-02 | End: 2021-11-30

## 2021-06-02 NOTE — TELEPHONE ENCOUNTER
Medication:   Requested Prescriptions     Pending Prescriptions Disp Refills    venlafaxine (EFFEXOR XR) 75 MG extended release capsule [Pharmacy Med Name: VENLAFAXINE HCL ER 75 MG CAP] 90 capsule 1     Sig: TAKE 1 CAPSULE BY MOUTH DAILY WITH DINNER        Last Filled:  4/22/21 #90, 1 RF     Patient Phone Number: [phone] (home) [phone] (work)    Last appt: 11/9/2020 results   Next appt: Visit date not found

## 2021-06-21 ENCOUNTER — PATIENT MESSAGE (OUTPATIENT)
Dept: FAMILY MEDICINE CLINIC | Age: 56
End: 2021-06-21

## 2021-06-22 NOTE — TELEPHONE ENCOUNTER
From: Lachelle Aguilera  To: Kevyn Woods MD  Sent: 6/21/2021 6:36 PM EDT  Subject: Non-Urgent Medical Question    Hi - I need to schedule my lab work and I misplaced the order. May I have another order for labs?  I typically go to [address], [place] [address]

## 2021-07-12 DIAGNOSIS — E11.9 TYPE 2 DIABETES MELLITUS WITHOUT COMPLICATION, WITHOUT LONG-TERM CURRENT USE OF INSULIN (HCC): ICD-10-CM

## 2021-07-12 DIAGNOSIS — E78.2 HYPERLIPIDEMIA, MIXED: ICD-10-CM

## 2021-07-12 RX ORDER — ROSUVASTATIN CALCIUM 5 MG/1
TABLET, COATED ORAL
Qty: 90 TABLET | Refills: 1 | Status: SHIPPED | OUTPATIENT
Start: 2021-07-12 | End: 2021-11-30

## 2021-07-12 NOTE — TELEPHONE ENCOUNTER
Medication:   Requested Prescriptions     Pending Prescriptions Disp Refills    rosuvastatin (CRESTOR) 5 MG tablet [Pharmacy Med Name: ROSUVASTATIN CALCIUM 5 MG TAB] 90 tablet 1     Sig: TAKE 1 TABLET BY MOUTH EVERY DAY AT NIGHT       Last Filled:  01/04/2021 #90 1rf     Patient Phone Number: [phone] (home) [phone] (work)    Last appt: 11/9/2020   Next appt: Visit date not found    Last Lipid:   Lab Results   Component Value Date    CHOL 276 11/04/2020    TRIG 258 11/04/2020    HDL 50 11/04/2020    HDL 52 09/06/2011    [address] 11/04/2020

## 2021-07-12 NOTE — TELEPHONE ENCOUNTER
[facility] Practice Support  Subject: Message to Provider     QUESTIONS   Information for Provider? Patient missed a call from the pcp's office. Patient would like a call back tomorrow. ---------------------------------------------------------------------------   --------------   Lloyd Muse INFO   What is the best way for the office to contact you? OK to leave message on   voicemail   Preferred Call Back Phone Number? [phone]   ---------------------------------------------------------------------------   --------------   SCRIPT ANSWERS   Relationship to Patient?  Self

## 2021-07-13 ENCOUNTER — TELEPHONE (OUTPATIENT)
Dept: FAMILY MEDICINE CLINIC | Age: 56
End: 2021-07-13

## 2021-07-13 DIAGNOSIS — E11.9 TYPE 2 DIABETES MELLITUS WITHOUT COMPLICATION, WITHOUT LONG-TERM CURRENT USE OF INSULIN (HCC): Primary | ICD-10-CM

## 2021-10-20 RX ORDER — VENLAFAXINE HYDROCHLORIDE 37.5 MG/1
37.5 CAPSULE, EXTENDED RELEASE ORAL
Qty: 90 CAPSULE | Refills: 0 | Status: SHIPPED | OUTPATIENT
Start: 2021-10-20 | End: 2021-12-01

## 2021-10-22 DIAGNOSIS — E11.9 TYPE 2 DIABETES MELLITUS WITHOUT COMPLICATIONS (HCC): ICD-10-CM

## 2021-10-22 RX ORDER — LANCETS 33 GAUGE
EACH MISCELLANEOUS
Qty: 100 EACH | Refills: 3 | Status: SHIPPED | OUTPATIENT
Start: 2021-10-22 | End: 2022-02-25

## 2021-10-22 RX ORDER — QUETIAPINE FUMARATE 50 MG/1
TABLET, FILM COATED ORAL
Qty: 90 TABLET | Refills: 1 | Status: SHIPPED | OUTPATIENT
Start: 2021-10-22 | End: 2022-04-25

## 2021-10-22 RX ORDER — METFORMIN HYDROCHLORIDE 500 MG/1
TABLET, EXTENDED RELEASE ORAL
Qty: 180 TABLET | Refills: 1 | Status: SHIPPED | OUTPATIENT
Start: 2021-10-22 | End: 2022-04-28

## 2021-10-22 NOTE — TELEPHONE ENCOUNTER
Medication:   Requested Prescriptions     Pending Prescriptions Disp Refills    QUEtiapine (SEROQUEL) 50 MG tablet [Pharmacy Med Name: QUETIAPINE FUMARATE 50 MG TAB] 90 tablet 1     Sig: TAKE 1 TABLET BY MOUTH EVERY DAY AT NIGHT    metFORMIN (GLUCOPHAGE-XR) 500 MG extended release tablet [Pharmacy Med Name: METFORMIN HCL  MG TABLET] 180 tablet 1     Sig: TAKE 1 TABLET BY MOUTH TWICE A DAY WITH MEALS       Last Filled:  Metformin 04/26/2021 #180 1rf  Seroquel 04/26/2021 #90 1rf    Patient Phone Number: [phone] (home) [phone] (work)    Last appt: 11/9/2020 VV  Next appt: 11/3/2021    Last Labs DM:   Lab Results   Component Value Date    LABA1C 6.5 11/04/2020

## 2021-10-25 ENCOUNTER — PATIENT MESSAGE (OUTPATIENT)
Dept: FAMILY MEDICINE CLINIC | Age: 56
End: 2021-10-25

## 2021-10-25 DIAGNOSIS — N64.4 BREAST PAIN: Primary | ICD-10-CM

## 2021-10-25 NOTE — TELEPHONE ENCOUNTER
From: Sameer Palomino  To: Jenny Gonzalez MD  Sent: 10/25/2021 11:41 AM EDT  Subject: Visit Follow-Up Question    I'm scheduling a mammogram and I am having some pain in my R breast. When I called to make my appointment they asked if my PCP can request a \"diagnostic\" since I was having some pain. Is this something that Dr. Ashley Velasco can provide and then I'll confirm my apppointment? Thank you!   Mary Modi

## 2021-10-26 DIAGNOSIS — N64.4 PAIN OF RIGHT BREAST: Primary | ICD-10-CM

## 2021-11-01 ENCOUNTER — HOSPITAL ENCOUNTER (OUTPATIENT)
Dept: MAMMOGRAPHY | Age: 56
Discharge: HOME | End: 2021-11-01
Payer: COMMERCIAL

## 2021-11-01 ENCOUNTER — HOSPITAL ENCOUNTER (OUTPATIENT)
Dept: ULTRASOUND IMAGING | Age: 56
Discharge: HOME | End: 2021-11-01
Payer: COMMERCIAL

## 2021-11-01 VITALS — WEIGHT: 222 LBS | BODY MASS INDEX: 36.99 KG/M2 | HEIGHT: 65 IN

## 2021-11-01 DIAGNOSIS — Z12.31 BREAST CANCER SCREENING BY MAMMOGRAM: ICD-10-CM

## 2021-11-01 DIAGNOSIS — N64.4 BREAST PAIN: ICD-10-CM

## 2021-11-01 DIAGNOSIS — N64.4 PAIN OF RIGHT BREAST: ICD-10-CM

## 2021-11-01 PROCEDURE — G0279 TOMOSYNTHESIS, MAMMO: HCPCS

## 2021-11-01 PROCEDURE — 76642 ULTRASOUND BREAST LIMITED: CPT

## 2021-11-30 DIAGNOSIS — E78.2 HYPERLIPIDEMIA, MIXED: ICD-10-CM

## 2021-11-30 DIAGNOSIS — E11.9 TYPE 2 DIABETES MELLITUS WITHOUT COMPLICATION, WITHOUT LONG-TERM CURRENT USE OF INSULIN (HCC): ICD-10-CM

## 2021-12-01 RX ORDER — ROSUVASTATIN CALCIUM 5 MG/1
TABLET, COATED ORAL
Qty: 90 TABLET | Refills: 0 | Status: SHIPPED | OUTPATIENT
Start: 2021-12-01 | End: 2022-02-24 | Stop reason: SDUPTHER

## 2021-12-01 RX ORDER — VENLAFAXINE HYDROCHLORIDE 75 MG/1
CAPSULE, EXTENDED RELEASE ORAL
Qty: 90 CAPSULE | Refills: 0 | Status: SHIPPED | OUTPATIENT
Start: 2021-12-01 | End: 2022-02-23

## 2021-12-01 RX ORDER — VENLAFAXINE HYDROCHLORIDE 37.5 MG/1
37.5 CAPSULE, EXTENDED RELEASE ORAL
Qty: 90 CAPSULE | Refills: 0 | Status: SHIPPED | OUTPATIENT
Start: 2021-12-01 | End: 2022-02-24 | Stop reason: SDUPTHER

## 2021-12-09 DIAGNOSIS — E11.9 TYPE 2 DIABETES MELLITUS WITHOUT COMPLICATION, WITHOUT LONG-TERM CURRENT USE OF INSULIN (HCC): ICD-10-CM

## 2021-12-09 DIAGNOSIS — E04.1 THYROID NODULE: Primary | ICD-10-CM

## 2021-12-10 DIAGNOSIS — E04.1 THYROID NODULE: Primary | ICD-10-CM

## 2021-12-21 ENCOUNTER — HOSPITAL ENCOUNTER (OUTPATIENT)
Dept: ULTRASOUND IMAGING | Age: 56
Discharge: HOME | End: 2021-12-21
Payer: COMMERCIAL

## 2021-12-21 DIAGNOSIS — E04.1 THYROID NODULE GREATER THAN OR EQUAL TO 1.5 CM IN DIAMETER INCIDENTALLY NOTED ON IMAGING STUDY: Primary | ICD-10-CM

## 2021-12-21 DIAGNOSIS — E04.1 THYROID NODULE: ICD-10-CM

## 2021-12-21 PROCEDURE — 76536 US EXAM OF HEAD AND NECK: CPT

## 2021-12-30 ENCOUNTER — TELEPHONE (OUTPATIENT)
Dept: ENDOCRINOLOGY | Age: 56
End: 2021-12-30

## 2021-12-30 DIAGNOSIS — E04.1 THYROID NODULE: Primary | ICD-10-CM

## 2021-12-30 NOTE — TELEPHONE ENCOUNTER
FYI:     Pt called and states Dr Louis Last recommended pt does Biopsy based on recent US done. Pt wishes to have this done before 2/1/22 appt.     Placed order for US Biopsy to right thyroid

## 2021-12-30 NOTE — TELEPHONE ENCOUNTER
I canceled the thyroid ultrasound order. I legally cannot order the test, because I have not seen and examined patient yet. The order has to come from family doctor. Please ask patient to contact family doctor to order FNA of right 3.1 cm thyroid nodule. We will try to schedule sooner appointment if available.

## 2022-01-10 DIAGNOSIS — E04.1 RIGHT THYROID NODULE: Primary | ICD-10-CM

## 2022-01-17 ENCOUNTER — HOSPITAL ENCOUNTER (OUTPATIENT)
Dept: ULTRASOUND IMAGING | Age: 57
Discharge: HOME | End: 2022-01-17
Payer: COMMERCIAL

## 2022-01-17 DIAGNOSIS — E04.1 RIGHT THYROID NODULE: ICD-10-CM

## 2022-01-17 PROCEDURE — 88173 CYTOPATH EVAL FNA REPORT: CPT

## 2022-01-17 PROCEDURE — 10005 FNA BX W/US GDN 1ST LES: CPT

## 2022-01-17 PROCEDURE — 88305 TISSUE EXAM BY PATHOLOGIST: CPT

## 2022-01-31 PROBLEM — E66.9 CLASS 2 OBESITY WITH BODY MASS INDEX (BMI) OF 36.0 TO 36.9 IN ADULT: Status: ACTIVE | Noted: 2022-01-31

## 2022-01-31 PROBLEM — E04.2 MULTINODULAR GOITER: Status: ACTIVE | Noted: 2022-01-31

## 2022-01-31 PROBLEM — E66.812 CLASS 2 OBESITY WITH BODY MASS INDEX (BMI) OF 36.0 TO 36.9 IN ADULT: Status: ACTIVE | Noted: 2022-01-31

## 2022-02-01 ENCOUNTER — TELEPHONE (OUTPATIENT)
Dept: ENDOCRINOLOGY | Age: 57
End: 2022-02-01

## 2022-02-01 ENCOUNTER — NURSE ONLY (OUTPATIENT)
Dept: FAMILY MEDICINE CLINIC | Age: 57
End: 2022-02-01
Payer: COMMERCIAL

## 2022-02-01 ENCOUNTER — OFFICE VISIT (OUTPATIENT)
Dept: ENDOCRINOLOGY | Age: 57
End: 2022-02-01
Payer: COMMERCIAL

## 2022-02-01 VITALS
BODY MASS INDEX: 39.99 KG/M2 | SYSTOLIC BLOOD PRESSURE: 120 MMHG | HEART RATE: 92 BPM | DIASTOLIC BLOOD PRESSURE: 86 MMHG | WEIGHT: 240 LBS | TEMPERATURE: 98 F | RESPIRATION RATE: 14 BRPM | OXYGEN SATURATION: 98 % | HEIGHT: 65 IN

## 2022-02-01 DIAGNOSIS — E55.9 VITAMIN D DEFICIENCY: ICD-10-CM

## 2022-02-01 DIAGNOSIS — E11.9 CONTROLLED TYPE 2 DIABETES MELLITUS WITHOUT COMPLICATION, WITHOUT LONG-TERM CURRENT USE OF INSULIN (HCC): Primary | ICD-10-CM

## 2022-02-01 DIAGNOSIS — R39.9 UTI SYMPTOMS: Primary | ICD-10-CM

## 2022-02-01 DIAGNOSIS — Z78.0 MENOPAUSE: ICD-10-CM

## 2022-02-01 DIAGNOSIS — E04.2 MULTINODULAR GOITER: ICD-10-CM

## 2022-02-01 DIAGNOSIS — E66.01 CLASS 2 SEVERE OBESITY WITH SERIOUS COMORBIDITY AND BODY MASS INDEX (BMI) OF 39.0 TO 39.9 IN ADULT, UNSPECIFIED OBESITY TYPE (HCC): ICD-10-CM

## 2022-02-01 DIAGNOSIS — L65.9 ALOPECIA: ICD-10-CM

## 2022-02-01 DIAGNOSIS — E78.2 HYPERLIPIDEMIA, MIXED: ICD-10-CM

## 2022-02-01 PROBLEM — E11.40 TYPE 2 DIABETES, CONTROLLED, WITH NEUROPATHY (HCC): Status: ACTIVE | Noted: 2022-02-01

## 2022-02-01 LAB
BILIRUBIN, POC: NORMAL
BLOOD URINE, POC: NORMAL
CLARITY, POC: CLEAR
COLOR, POC: YELLOW
GLUCOSE URINE, POC: NORMAL
KETONES, POC: NORMAL
LEUKOCYTE EST, POC: NORMAL
NITRITE, POC: NORMAL
PH, POC: 5
PROTEIN, POC: NORMAL
SPECIFIC GRAVITY, POC: 1.02
UROBILINOGEN, POC: 0.2

## 2022-02-01 PROCEDURE — 99205 OFFICE O/P NEW HI 60 MIN: CPT | Performed by: INTERNAL MEDICINE

## 2022-02-01 PROCEDURE — G8427 DOCREV CUR MEDS BY ELIG CLIN: HCPCS | Performed by: INTERNAL MEDICINE

## 2022-02-01 PROCEDURE — 2022F DILAT RTA XM EVC RTNOPTHY: CPT | Performed by: INTERNAL MEDICINE

## 2022-02-01 PROCEDURE — G8417 CALC BMI ABV UP PARAM F/U: HCPCS | Performed by: INTERNAL MEDICINE

## 2022-02-01 PROCEDURE — G8484 FLU IMMUNIZE NO ADMIN: HCPCS | Performed by: INTERNAL MEDICINE

## 2022-02-01 PROCEDURE — 3017F COLORECTAL CA SCREEN DOC REV: CPT | Performed by: INTERNAL MEDICINE

## 2022-02-01 PROCEDURE — 3044F HG A1C LEVEL LT 7.0%: CPT | Performed by: INTERNAL MEDICINE

## 2022-02-01 PROCEDURE — 81002 URINALYSIS NONAUTO W/O SCOPE: CPT | Performed by: FAMILY MEDICINE

## 2022-02-01 PROCEDURE — 1036F TOBACCO NON-USER: CPT | Performed by: INTERNAL MEDICINE

## 2022-02-01 RX ORDER — SEMAGLUTIDE 1.34 MG/ML
INJECTION, SOLUTION SUBCUTANEOUS
Qty: 3 PEN | Refills: 3 | Status: SHIPPED | OUTPATIENT
Start: 2022-02-01 | End: 2022-07-08

## 2022-02-01 NOTE — PROGRESS NOTES
Otoniel Santacruz is a 49 y.o. female who is being evaluated for Type 2 diabetes mellitus. Current symptoms/problems include none and show no change. Controlled type 2 diabetes mellitus without complication, without long-term current use of insulin ([facility] [E11.9]    Diagnosed with Type 2 diabetes mellitus in 2014. Comorbid conditions: hyperlipidemia and Neuropathy    Current diabetic medications include: Metformin  mg bid    Intolerance to diabetes medications: No     Weight trend: stable  Prior visit with dietician: yes  Current diet: on average, 3 meals per day  Current exercise: walking     Current monitoring regimen: home blood tests - 1 times daily  Has brought blood glucose log/meter:  No  Home blood sugar records: fasting range: 150 and postprandial range: 170-180   Any episodes of hypoglycemia? No  Hypoglycemia frequency and time(s):    Does patient have Glucagon emergency kit? No  Does patient have rapid acting carbohydrate? No  Does patient wear a medic alert bracelet or necklace? No    2. Multinodular goiter  This started in 2016. Patient was diagnosed with thyroid nodule. The problem has been unchanged. Previous thyroid studies include: TSH and free thyroxine. Patient started medication in N/A. Currently patient is on: N/A. Misses  N/A doses a month. Current complaints: fatigue, brain fog, difficulty loosing weight  Right 3.1 cm and 0.8 cm thyroid nodules  FNA of right 3.1 cm nodule nondiagnostic  Had F[place] in [place]  Report not available    History of obstructive symptoms: difficulty swallowing No, changes in voice/hoarseness No.  History of radiation to patient's neck: No  Resent iodine exposure: No  Family history includes mother thyroid nodules  Family history of thyroid cancer: No    3. Class 2 severe obesity with serious comorbidity and body mass index (BMI) of 39.0 to 39.9 in adult, unspecified obesity type (HCC)  Eats healthy, walks    4.  Hyperlipidemia, mixed  No muscle pain    5. Vitamin D deficiency  Has fatigue    6. Menopause  No premature menopause   Has some sweating    7. Alopecia  Diffuse hair loss      THYROID ULTRASOUND   History : nodule   Comparison : none       COMMENTS :    Thyroid size : Right lobe 5.1 x 2.3 x 2.1 cm                                     Left lobe 4.6 x 1.3 x 1.4 cm   Echotexture : normal   Nodules : 3.1 x 2.3 x 1.7 cm mixed cystic/solid nodule in the right upper gland. The solid components demonstrate color Doppler flow. 8 x 8 x 8 mm hypoechoic nodule in the right inferior gland.                   Impression   IMPRESSION :       Cystic and solid 3.1 cm nodule in the right upper gland for which biopsy is recommended (ACR TR 3).         Completed:     01/18/2022   Perform Phys: Obed Schneider MD   FINAL DIAGNOSIS:     Thyroid, Right Fine Needle Aspiration:   Non-Diagnostic aspiration specimen. No epithelial component identified. Numerous macrophages and debris present. See case comment. COMMENT:    Insufficient follicular epithelial cells are available for   cytologic evaluation.  However, the specimen contains abundant   macrophages with debris and would be supportive of aspiration of a cystic   lesion in the appropriate clinical setting.  Clinical correlation is   recommended.  ................ Zoraida Acosta     ISNESSA/ISENC       Past Medical History:   Diagnosis Date    Depression     Hyperlipidemia, mixed 4/11/2018    Internal hemorrhoid     New onset type 2 diabetes mellitus ([facility] 4/11/2018    Thyroid nodule       Patient Active Problem List   Diagnosis    Internal hemorrhoid    Adjustment disorder with depressed mood    Vitamin D deficiency    Type 2 diabetes mellitus without complication, without long-term current use of insulin (HCC)    Hyperlipidemia, mixed    Thyroid nodule    Primary insomnia    Multinodular goiter    Class 2 obesity with body mass index (BMI) of 39.0 to 39.9 in adult    Type 2 diabetes, controlled, with neuropathy ([facility]    Menopause    Alopecia     Past Surgical History:   Procedure Laterality Date    ENDOMETRIAL ABLATION       Social History     Socioeconomic History    Marital status: Single     Spouse name: Not on file    Number of children: Not on file    Years of education: Not on file    Highest education level: Not on file   Occupational History    Not on file   Tobacco Use    Smoking status: Former Smoker     Packs/day: 1.00     Years: 2.00     Pack years: 2.00     Types: Cigarettes     Start date: 1980     Quit date: 2005     Years since quittin.4    Smokeless tobacco: Never Used   Substance and Sexual Activity    Alcohol use: Not Currently     Comment: social    Drug use: No    Sexual activity: Not on file   Other Topics Concern    Not on file   Social History Narrative    Not on file     Social Determinants of Health     Financial Resource Strain:     Difficulty of Paying Living Expenses: Not on file   Food Insecurity:     Worried About [address] in the Last Year: Not on file    [address] in the Last Year: Not on file   Transportation Needs:     Lack of Transportation (Medical): Not on file    Lack of Transportation (Non-Medical):  Not on file   Physical Activity:     Days of Exercise per Week: Not on file    Minutes of Exercise per Session: Not on file   Stress:     Feeling of Stress : Not on file   Social Connections:     Frequency of Communication with Friends and Family: Not on file    Frequency of Social Gatherings with Friends and Family: Not on file    Attends Anglican Services: Not on file    Active Member of Clubs or Organizations: Not on file    Attends Club or Organization Meetings: Not on file    Marital Status: Not on file   Intimate Partner Violence:     Fear of Current or Ex-Partner: Not on file    Emotionally Abused: Not on file    Physically Abused: Not on file    Sexually Abused: Not on file   Housing Stability:     Unable to Pay for Housing in the Last Year: Not on file    Number of Places Lived in the Last Year: Not on file    Unstable Housing in the Last Year: Not on file     Family History   Problem Relation Age of Onset    High Cholesterol Mother     Prostate Cancer Father         mets to aorta     Rheum Arthritis Sister      Current Outpatient Medications   Medication Sig Dispense Refill    Semaglutide,0.25 or 0.5MG/DOS, (OZEMPIC, 0.25 OR 0.5 MG/DOSE,) 2 MG/1.5ML SOPN 0.25 mg weekly for 4 weeks, then 0.5 mg weekly 3 pen 3    venlafaxine (EFFEXOR XR) 37.5 MG extended release capsule TAKE 1 CAPSULE BY MOUTH DAILY WITH SUPPER 90 capsule 0    venlafaxine (EFFEXOR XR) 75 MG extended release capsule TAKE 1 CAPSULE BY MOUTH DAILY WITH DINNER 90 capsule 0    rosuvastatin (CRESTOR) 5 MG tablet TAKE 1 TABLET BY MOUTH EVERY DAY AT NIGHT 90 tablet 0    ONETOUCH ULTRA strip TEST DAILY 100 strip 5    QUEtiapine (SEROQUEL) 50 MG tablet TAKE 1 TABLET BY MOUTH EVERY DAY AT NIGHT 90 tablet 1    metFORMIN (GLUCOPHAGE-XR) 500 MG extended release tablet TAKE 1 TABLET BY MOUTH TWICE A DAY WITH MEALS 180 tablet 1    Lancets (ONETOUCH DELICA PLUS AFUOKQ12N) MISC USE AS DIRECTED 100 each 3    Blood Glucose Monitoring Suppl ([employer] BLOOD GLUCOSE METER) w/Device KIT 1 each by Does not apply route daily NOTE PHARMACY: ONE TOUCH ULTRA METER 1 kit 0    blood glucose test strips (ACCU-CHEK JUSTO) strip 1 each by In Vitro route daily As needed. 100 each 3    Blood Glucose Monitoring Suppl (ACCU-CHEK JUSTO PLUS) w/Device KIT Tid 1 kit 0    aspirin EC 81 MG EC tablet Take 1 tablet by mouth daily 90 tablet 3    multivitamin (THERAGRAN) per tablet Take 1 tablet by mouth daily. No current facility-administered medications for this visit.      No Known Allergies  Family Status   Relation Name Status    Mother  Alive    Father   at age 80        aortic carcinoma    Sister  Alive       Lab Review:    Lab Results   Component Value Date WBC 5.2 01/03/2022    HGB 13.3 01/03/2022    HCT 39.8 01/03/2022    MCV 87 01/03/2022     01/03/2022     Lab Results   Component Value Date     01/03/2022    K 4.5 01/03/2022     01/03/2022    CO2 24 01/03/2022    BUN 11 01/03/2022    CREATININE 0.73 01/03/2022    GLUCOSE 165 01/03/2022    CALCIUM 9.0 01/03/2022    PROT 6.9 01/03/2022    PROT 7.2 09/06/2011    LABALBU 4.4 01/03/2022    BILITOT 0.2 01/03/2022    ALKPHOS 100 01/03/2022    AST 11 01/03/2022    ALT 11 01/03/2022    LABGLOM 94 01/03/2022    GFRAA 108 01/03/2022    GFRAA >60 09/06/2011    AGRATIO 1.8 01/03/2022    GLOB 2.5 01/03/2022     Lab Results   Component Value Date    TSH 2.270 01/03/2022     Lab Results   Component Value Date    LABA1C 6.8 01/03/2022     Lab Results   Component Value Date    .9 11/04/2020     Lab Results   Component Value Date    CHOL 186 01/03/2022     Lab Results   Component Value Date    TRIG 201 01/03/2022     Lab Results   Component Value Date    HDL 51 01/03/2022    HDL 52 09/06/2011     Lab Results   Component Value Date    LDLCALC 101 01/03/2022     Lab Results   Component Value Date    LABVLDL 34 01/03/2022     No results found for: CHOLHDLRATIO  No results found for: LABMICR, SPEK12CFA  Lab Results   Component Value Date    VITD25 26 09/06/2011        Review of Systems:  Constitutional: has fatigue, no fever, no recent weight gain, no recent weight loss, no changes in appetite  Eyes: no eye pain, no change in vision, no eye redness, no eye irritation, no double vision  Ears, nose, throat: no nasal congestion, no sore throat, no earache, no decrease in hearing, no hoarseness, no dry mouth, no sinus problems, no difficulty swallowing, no neck lumps, no dental problems, no mouth sores, no ringing in ears  Pulmonary: no shortness of breath, no wheezing, no dyspnea on exertion, no cough  Cardiovascular: no chest pain, no lower extremity edema, no orthopnea, no intermittent leg claudication, no palpitations  Gastrointestinal: no abdominal pain, no nausea, no vomiting, no diarrhea, no constipation, no dysphagia, no heartburn, no bloating  Genitourinary: no dysuria, no urinary incontinence, no urinary hesitancy, has urinary frequency, no feelings of urinary urgency, no nocturia  Musculoskeletal: no joint swelling, no joint stiffness, no joint pain, no muscle cramps, no muscle pain, no bone pain  Integument/Breast: has hair loss, no skin rashes, no skin lesions, no itching, has dry skin  Neurological: no numbness, no tingling, no weakness, no confusion, has headaches, no dizziness, no fainting, no tremors, no decrease in memory, no balance problems  Psychiatric: no anxiety, has depression, had insomnia  Hematologic/Lymphatic: no tendency for easy bleeding, no swollen lymph nodes, no tendency for easy bruising  Immunology: has seasonal allergies, no frequent infections, no frequent illnesses  Endocrine: no temperature intolerance    /86   Pulse 92   Temp 98 °F (36.7 °C)   Resp 14   Ht 5' 5\" (1.651 m)   Wt 240 lb (108.9 kg)   SpO2 98%   BMI 39.94 kg/m²    Wt Readings from Last 3 Encounters:   02/01/22 240 lb (108.9 kg)   11/01/21 222 lb (100.7 kg)   11/04/20 235 lb (106.6 kg)     Body mass index is 39.94 kg/m².       OBJECTIVE:  Constitutional: no acute distress, well appearing and well nourished  Psychiatric: oriented to person, place and time, judgement and insight and normal, recent and remote memory and intact and mood and affect are normal  Skin: skin and subcutaneous tissue is normal without mass, normal turgor  Head and Face: examination of head and face revealed no abnormalities  Eyes: no lid or conjunctival swelling, erythema or discharge, pupils are normal, equal, round, reactive to light  Ears/Nose: external inspection of ears and nose revealed no abnormalities, hearing is grossly normal  Oropharynx/Mouth/Face: lips, tongue and gums are normal with no lesions, the voice quality was normal  Neck: neck is supple and symmetric, with midline trachea and no masses, thyroid is enlarged, palpable 3 cm right thyroid nodule  Lymphatics: normal cervical lymph nodes, normal supraclavicular nodes  Pulmonary: no increased work of breathing or signs of respiratory distress, lungs are clear to auscultation  Cardiovascular: normal heart rate and rhythm, normal S1 and S2, no murmurs and pedal pulses and 2+ bilaterally, No edema  Abdomen: abdomen is soft, non-tender with no masses  Musculoskeletal: normal gait and station and exam of the digits and nails are normal  Neurological: normal coordination and normal general cortical function    Visual inspection:  Deformity/amputation: absent  Skin lesions/pre-ulcerative calluses: absent  Edema: right- negative, left- negative    Sensory exam:  Monofilament sensation: normal  (minimum of 5 random plantar locations tested, avoiding callused areas - > 1 area with absence of sensation is + for neuropathy)    Plus at least one of the following:  Pulses: normal  Proprioception: Intact  Vibration (128 Hz): Intact    ASSESSMENT/PLAN:    1. Controlled type 2 diabetes mellitus without complication, without long-term current use of insulin ([facility])  Continue Metformin  mg 1 tablet twice a day  Start Ozempic, 0.25 mg weekly, increase as tolerated  - C-Peptide; Future  - Basic Metabolic Panel; Future  -  DIABETES FOOT EXAM  - [facility] Individual Diabetes Education (Non Care Coord Patient), [facility]  - Semaglutide,0.25 or 0.5MG/DOS, (OZEMPIC, 0.25 OR 0.5 MG/DOSE,) 2 MG/1.5ML SOPN; 0.25 mg weekly for 4 weeks, then 0.5 mg weekly  Dispense: 3 pen; Refill: 3    2. Multinodular goiter  Thyroid sonogram 12/21/2021-right 3.1 cm and right 0.8 cm nodules. 1/17/2022 FNA cytology: Right 3.1 cm nodule-nondiagnostic. Recommend to repeat FNA of 3.1 cm cystic nodule. [place] when lived [place]. Obtain report.   - T3, Free; Future  - T4, Free; Future  - Anti-Thyroglobulin Antibody; Future  - Thyroid Peroxidase Antibody; Future  - TSH without Reflex; Future  - US BIOPSY THYROID; Future    3. Class 2 severe obesity with serious comorbidity and body mass index (BMI) of 39.0 to 39.9 in adult, unspecified obesity type (HCC)  Diet and exercise    4. Hyperlipidemia, mixed  Continue rosuvastatin 5 mg daily  -Lipid panel    5. Vitamin D deficiency  Continue multivitamin  -25 hydroxy vitamin D    6. Menopause  - Luteinizing Hormone; Future  - Estradiol; Future  - Follicle Stimulating Hormone; Future  - DHEA-Sulfate; Future  - Testosterone, free, total; Future    7. Alopecia  - Luteinizing Hormone; Future  - Estradiol; Future  - Follicle Stimulating Hormone; Future  - DHEA-Sulfate; Future  - Testosterone, free, total; Future  - T3, Free; Future  - T4, Free; Future  - Anti-Thyroglobulin Antibody; Future  - Thyroid Peroxidase Antibody; Future  - TSH without Reflex; Future    Reviewed and/or ordered clinical lab results Yes  Reviewed and/or ordered radiology tests Yes  Reviewed and/or ordered other diagnostic tests No  Discussed test results with performing physician No  Independently reviewed image, tracing, or specimen No  Made a decision to obtain old records Yes  Reviewed and summarized old records Yes   TSH 2.8-2.27  HbA1C 6.8    Obtained history from other than patient No    Patsy Person was counseled regarding symptoms of diabetes, thyroid nodule, alopecia, menopause diagnosis, course and complications of disease if inadequately treated, side effects of medications, diagnosis, treatment options, and prognosis, risks, benefits, complications, and alternatives of treatment, labs, imaging and other studies and treatment targets and goals, diabetes complication prevention, diabetes pathophysiology, basal and prandial insulin requirements, on Ozempic, side effects and benefits, weight management, thyroid nodule biopsy, risk of thyroid cancer, thyroid nodule follow-up.   She understands instructions and counseling. These diagnosis were discussed and reviewed with the patient including the advantages of drug therapy. She was counseled at this visit on the following: diabetes complication prevention and foot care. Total time I spent for this encounter 60 minutes    Return in about 1 month (around 3/1/2022) for diabetes, thyroid problems.     Electronically signed by Ryder Landa MD on 2/1/2022 at 9:19 PM

## 2022-02-01 NOTE — PROGRESS NOTES
SUBJECTIVE:  Jerald Dyer is a 49 y.o. female who is being evaluated for hypothyroidism. This started in ***. Patient was diagnosed with ***. The problem has been {clinical course:17::\"unchanged\"}. Previous thyroid studies include: {thyroid tests:13661}. Patient started medication in ***. Currently patient is on: ***. Misses  *** doses a month. Current complaints: {thyroid sx:61024}  Right 3.1 cm and 0.8 cm thyroid nodules  FNA of right 3.1 cm nodule nondiagnostic      History of obstructive symptoms: difficulty swallowing {YES / NO:19727}, changes in voice/hoarseness {YES / RK:14376}. History of radiation to patient's neck: {YES / HQ:98992}  Resent iodine exposure: {YES / EU:31219}  Family history includes {family history:53085}. Family history of thyroid cancer: {YES / NO:19727}    1. Multinodular goiter  ***    2. Class 2 severe obesity with serious comorbidity and body mass index (BMI) of 36.0 to 36.9 in adult, unspecified obesity type (HCC)  ***    THYROID ULTRASOUND   History : nodule   Comparison : none       COMMENTS :    Thyroid size : Right lobe 5.1 x 2.3 x 2.1 cm                                     Left lobe 4.6 x 1.3 x 1.4 cm   Echotexture : normal   Nodules : 3.1 x 2.3 x 1.7 cm mixed cystic/solid nodule in the right upper gland. The solid components demonstrate color Doppler flow. 8 x 8 x 8 mm hypoechoic nodule in the right inferior gland.                   Impression   IMPRESSION :       Cystic and solid 3.1 cm nodule in the right upper gland for which biopsy is recommended (ACR TR 3).         Completed:     01/18/2022   Perform Phys: Bean Olvera MD   FINAL DIAGNOSIS:     Thyroid, Right Fine Needle Aspiration:   Non-Diagnostic aspiration specimen. No epithelial component identified. Numerous macrophages and debris present. See case comment.      COMMENT:    Insufficient follicular epithelial cells are available for   cytologic evaluation.  However, the specimen contains abundant   macrophages with debris and would be supportive of aspiration of a cystic   lesion in the appropriate clinical setting.  Clinical correlation is   recommended.  ................ Hali DURON/OLIVERIO   Past Medical History:   Diagnosis Date    Depression     Hyperlipidemia, mixed 2018    Internal hemorrhoid     New onset type 2 diabetes mellitus ([facility] [address] 2018    Thyroid nodule      Patient Active Problem List    Diagnosis Date Noted    Multinodular goiter 2022    Class 2 obesity with body mass index (BMI) of 36.0 to 36.9 in adult 2022    Primary insomnia 2020    Type 2 diabetes mellitus without complication, without long-term current use of insulin ([facility] [address] 2018    Hyperlipidemia, mixed 2018    Thyroid nodule 2018    Adjustment disorder with depressed mood 10/24/2012    Vitamin D deficiency 10/24/2012    Internal hemorrhoid      Past Surgical History:   Procedure Laterality Date    ENDOMETRIAL ABLATION       Family History   Problem Relation Age of Onset    High Cholesterol Mother     Prostate Cancer Father         mets to aorta     Rheum Arthritis Sister      Social History     Socioeconomic History    Marital status: Single     Spouse name: Not on file    Number of children: Not on file    Years of education: Not on file    Highest education level: Not on file   Occupational History    Not on file   Tobacco Use    Smoking status: Former Smoker     Packs/day: 1.00     Types: Cigarettes     Start date: 1980     Quit date: 2005     Years since quittin.4    Smokeless tobacco: Never Used   Substance and Sexual Activity    Alcohol use: Yes     Comment: social    Drug use: No    Sexual activity: Not on file   Other Topics Concern    Not on file   Social History Narrative    Not on file     Social Determinants of Health     Financial Resource Strain:     Difficulty of Paying Living Expenses: Not on file   Food Insecurity:     Worried About [facility] in the Last Year: Not on file    Armida of Food in the Last Year: Not on file   Transportation Needs:     Lack of Transportation (Medical): Not on file    Lack of Transportation (Non-Medical):  Not on file   Physical Activity:     Days of Exercise per Week: Not on file    Minutes of Exercise per Session: Not on file   Stress:     Feeling of Stress : Not on file   Social Connections:     Frequency of Communication with Friends and Family: Not on file    Frequency of Social Gatherings with Friends and Family: Not on file    Attends Oriental orthodox Services: Not on file    Active Member of Clubs or Organizations: Not on file    Attends Club or Organization Meetings: Not on file    Marital Status: Not on file   Intimate Partner Violence:     Fear of Current or Ex-Partner: Not on file    Emotionally Abused: Not on file    Physically Abused: Not on file    Sexually Abused: Not on file   Housing Stability:     Unable to Pay for Housing in the Last Year: Not on file    Number of Places Lived in the Last Year: Not on file    Unstable Housing in the Last Year: Not on file     Current Outpatient Medications   Medication Sig Dispense Refill    venlafaxine (EFFEXOR XR) 37.5 MG extended release capsule TAKE 1 CAPSULE BY MOUTH DAILY WITH SUPPER 90 capsule 0    venlafaxine (EFFEXOR XR) 75 MG extended release capsule TAKE 1 CAPSULE BY MOUTH DAILY WITH DINNER 90 capsule 0    rosuvastatin (CRESTOR) 5 MG tablet TAKE 1 TABLET BY MOUTH EVERY DAY AT NIGHT 90 tablet 0    ONETOUCH ULTRA strip TEST DAILY 100 strip 5    QUEtiapine (SEROQUEL) 50 MG tablet TAKE 1 TABLET BY MOUTH EVERY DAY AT NIGHT 90 tablet 1    metFORMIN (GLUCOPHAGE-XR) 500 MG extended release tablet TAKE 1 TABLET BY MOUTH TWICE A DAY WITH MEALS 180 tablet 1    Lancets (ONETOUCH DELICA PLUS [id number]) MISC USE AS DIRECTED 100 each 3    QUEtiapine (SEROQUEL) 25 MG tablet Take 25 mg by mouth 2 times daily      Blood Glucose Monitoring Suppl ([employer] BLOOD GLUCOSE METER) w/Device KIT 1 each by Does not apply route daily NOTE PHARMACY: ONE TOUCH ULTRA METER 1 kit 0    blood glucose test strips (ACCU-CHEK JUSTO) strip 1 each by In Vitro route daily As needed. 100 each 3    venlafaxine (EFFEXOR XR) 150 MG extended release capsule TAKE 1 CAPSULE BY MOUTH EVERY DAY 30 capsule 0    Blood Glucose Monitoring Suppl (ACCU-CHEK JUSTO PLUS) w/Device KIT Tid 1 kit 0    aspirin EC 81 MG EC tablet Take 1 tablet by mouth daily 90 tablet 3    multivitamin (THERAGRAN) per tablet Take 1 tablet by mouth daily. No current facility-administered medications for this visit.      No Known Allergies  Family Status   Relation Name Status    Mother  Alive    Father   at age 80        aortic carcinoma    Sister  Alive       Review of Systems:  Constitutional: no fatigue, no fever, no recent weight gain, no recent weight loss, no changes in appetite  Eyes: no eye pain, no change in vision, no eye redness, no eye irritation, no double vision  Ears, nose, throat: no nasal congestion, no sore throat, no earache, no decrease in hearing, no hoarseness, no dry mouth, no sinus problems, no difficulty swallowing, no neck lumps, no dental problems, no mouth sores, no ringing in ears  Pulmonary: no shortness of breath, no wheezing, no dyspnea on exertion, no cough  Cardiovascular: no chest pain, no lower extremity edema, no orthopnea, no intermittent leg claudication, no palpitations  Gastrointestinal: no abdominal pain, no nausea, no vomiting, no diarrhea, no constipation, no dysphagia, no heartburn, no bloating  Genitourinary: no dysuria, no urinary incontinence, no urinary hesitancy, no urinary frequency, no feelings of urinary urgency, no nocturia  Musculoskeletal: no joint swelling, no joint stiffness, no joint pain, no muscle cramps, no muscle pain, no bone pain  Integument/Breast: no hair loss, no skin rashes, no skin lesions, no itching, no dry skin  Neurological: no numbness, no tingling, no weakness, no confusion, no headaches, no dizziness, no fainting, no tremors, no decrease in memory, no balance problems  Psychiatric: no anxiety, no depression, no insomnia  Hematologic/Lymphatic: no tendency for easy bleeding, no swollen lymph nodes, no tendency for easy bruising  Immunology: no seasonal allergies, no frequent infections, no frequent illnesses  Endocrine: no temperature intolerance    There were no vitals taken for this visit. Wt Readings from Last 3 Encounters:   11/01/21 222 lb (100.7 kg)   11/04/20 235 lb (106.6 kg)   06/28/18 234 lb (106.1 kg)     There is no height or weight on file to calculate BMI.     OBJECTIVE:  Constitutional: no acute distress, well appearing and well nourished  Psychiatric: oriented to person, place and time, judgement and insight and normal, recent and remote memory and intact and mood and affect are normal  Skin: skin and subcutaneous tissue is normal without mass, normal turgor  Head and Face: examination of head and face revealed no abnormalities  Eyes: no lid or conjunctival swelling, erythema or discharge, pupils are normal, equal, round, reactive to light  Ears/Nose: external inspection of ears and nose revealed no abnormalities, hearing is grossly normal  Oropharynx/Mouth/Face: lips, tongue and gums are normal with no lesions, the voice quality was normal  Neck: neck is supple and symmetric, with midline trachea and no masses, thyroid is normal  Lymphatics: normal cervical lymph nodes, normal supraclavicular nodes  Pulmonary: no increased work of breathing or signs of respiratory distress, lungs are clear to auscultation  Cardiovascular: normal heart rate and rhythm, normal S1 and S2, no murmurs and pedal pulses and 2+ bilaterally, {NO/1+/2+:44450} edema  Abdomen: abdomen is soft, non-tender with no masses  Musculoskeletal: normal gait and station and exam of the digits and nails are normal  Neurological: normal coordination and normal general cortical function      Lab Review:    Lab Results   Component Value Date    WBC 5.2 01/03/2022    HGB 13.3 01/03/2022    HCT 39.8 01/03/2022    MCV 87 01/03/2022     01/03/2022     Lab Results   Component Value Date     01/03/2022    K 4.5 01/03/2022     01/03/2022    CO2 24 01/03/2022    BUN 11 01/03/2022    CREATININE 0.73 01/03/2022    GLUCOSE 165 01/03/2022    CALCIUM 9.0 01/03/2022    PROT 6.9 01/03/2022    PROT 7.2 09/06/2011    LABALBU 4.4 01/03/2022    BILITOT 0.2 01/03/2022    ALKPHOS 100 01/03/2022    AST 11 01/03/2022    ALT 11 01/03/2022    LABGLOM 94 01/03/2022    GFRAA 108 01/03/2022    GFRAA >60 09/06/2011    AGRATIO 1.8 01/03/2022    GLOB 2.5 01/03/2022     Lab Results   Component Value Date    TSH 2.270 01/03/2022     Lab Results   Component Value Date    LABA1C 6.8 01/03/2022     Lab Results   Component Value Date    .9 11/04/2020     Lab Results   Component Value Date    CHOL 186 01/03/2022     Lab Results   Component Value Date    TRIG 201 01/03/2022     Lab Results   Component Value Date    HDL 51 01/03/2022    HDL 52 09/06/2011     Lab Results   Component Value Date    LDLCALC 101 01/03/2022     Lab Results   Component Value Date    LABVLDL 34 01/03/2022     No results found for: CHOLHDLRATIO  No results found for: LABMICR, [id number]  Lab Results   Component Value Date    VITD25 26 09/06/2011        ASSESSMENT/PLAN:  1. Multinodular goiter  ***    2. Class 2 severe obesity with serious comorbidity and body mass index (BMI) of 36.0 to 36.9 in adult, unspecified obesity type (HCC)  ***      Reviewed and/or ordered clinical lab results {YES/NO:19726}  Reviewed and/or ordered radiology tests {YES/NO:19726}   Reviewed and/or ordered other diagnostic tests {YES/NO:19726}  Discussed test results with performing physician {YES/NO:19726}  Independently reviewed image, tracing, or specimen {YES/NO:19726}  Made a decision to obtain old records {YES/NO:19726}  Reviewed and summarized old records {YES/NO:19726}   TSH 2.27  Hemoglobin A1c 6.16.56.84.8  Obtained history from other than patient {YES/NO:19726}    Renetta Nuñez was counseled regarding symptoms of current diagnosis, course and complications of disease if inadequately treated, side effects of medications, diagnosis, treatment options, and prognosis, risks, benefits, complications, and alternatives of treatment, labs, imaging and other studies and treatment targets and goals. She understands instructions and counseling. No follow-ups on file.     Electronically signed by Sandor Epley, MD on 1/31/2022 at 11:54 PM

## 2022-02-02 LAB — URINE CULTURE, ROUTINE: NORMAL

## 2022-02-02 NOTE — TELEPHONE ENCOUNTER
Please obtain records of prior patient's thyroid biopsy done in [place]. Ask patient to help obtain records or sign release.

## 2022-02-11 NOTE — TELEPHONE ENCOUNTER
Pt called back and said she will have them fax it to us. Will await results. Pt is working on getting them.

## 2022-02-15 NOTE — TELEPHONE ENCOUNTER
As of 2/15 no results yet. Pt aware and will contact them. To be resumed at later time when pt gets them faxed to us.

## 2022-02-23 RX ORDER — VENLAFAXINE HYDROCHLORIDE 75 MG/1
CAPSULE, EXTENDED RELEASE ORAL
Qty: 30 CAPSULE | Refills: 0 | Status: SHIPPED | OUTPATIENT
Start: 2022-02-23 | End: 2022-02-24 | Stop reason: SDUPTHER

## 2022-02-23 NOTE — TELEPHONE ENCOUNTER
Medication:   Requested Prescriptions     Pending Prescriptions Disp Refills    venlafaxine (EFFEXOR XR) 75 MG extended release capsule [Pharmacy Med Name: VENLAFAXINE HCL ER 75 MG CAP] 90 capsule 0     Sig: TAKE 1 CAPSULE BY MOUTH DAILY WITH DINNER. Last Filled:  12/01/2021 #[id number]     Patient Phone Number: [phone] (home)     Last appt: 11/9/2020   Next appt: LM patient needs appointment     Last OARRS: No flowsheet data found.

## 2022-02-23 NOTE — PROGRESS NOTES
Wiliam Fitting is a 49 y.o. female. HPI:  Wiliam Fitting, was evaluated through a synchronous (real-time) audio-video encounter. The patient (or guardian if applicable) is aware that this is a billable service, which includes applicable co-pays. This Virtual Visit was conducted with patient's (and/or legal guardian's) consent. The visit was conducted pursuant to the emergency declaration under the [facility], [address] [place] [address] authority and the [employer] Act. Patient identification was verified, and a caregiver was present when appropriate. The patient was located in a state where the provider was licensed to provide care. --Tiesha Bravo MD on 2/24/2022 at 9:25 AM    An electronic signature was used to authenticate this note. Vv for med check   Doing well on her Effexor, takes 37.5 mg in the morning and 75 mg in the evening. .. Due for refill     Last seen 11/2020 in office   Seeing Dr. Mare Greene/ kirill, labs = pending   Health maintenance reviewed with patient in detail  Recommend colonoscopy screen, order    Due for GYN exam also  Recommend hep C and hep B screen       Wt Readings from Last 3 Encounters:   02/01/22 240 lb (108.9 kg)   11/01/21 222 lb (100.7 kg)   11/04/20 235 lb (106.6 kg)       REVIEW OF SYSTEMS:   CONSTITUTIONAL: See history of present illness,   Weight noted   HEENT: No new vision difficulties or ringing in the ears. RESPIRATORY: No new SOB, PND, orthopnea or cough. CARDIOVASCULAR: no CP, palpitations or SOB with exertion  GI: No nausea, vomiting, diarrhea, constipation, abdominal pain or changes in bowel habits. : No urinary frequency, urgency, incontinence hematuria or dysuria. SKIN: No cyanosis or skin lesions. MUSCULOSKELETAL: No new muscle or joint pain. NEUROLOGICAL: No syncope or TIA-like symptoms.    PSYCHIATRIC: No anxiety, insomnia or depression     No Known Allergies    Prior to Visit Medications    Medication Sig Taking? Authorizing Provider   venlafaxine (EFFEXOR XR) 75 MG extended release capsule 1 tablet po nightly Yes Greg Koroma MD   venlafaxine (EFFEXOR XR) 37.5 MG extended release capsule Take 1 capsule by mouth daily (with breakfast) Yes Greg Koroma MD   rosuvastatin (CRESTOR) 5 MG tablet Take 1 tablet by mouth nightly Yes Greg Koroma MD   Semaglutide,0.25 or 0.5MG/DOS, (OZEMPIC, 0.25 OR 0.5 MG/DOSE,) 2 MG/1.5ML SOPN 0.25 mg weekly for 4 weeks, then 0.5 mg weekly Yes Cynthia Rockwell MD   ONETOUCH ULTRA strip TEST DAILY Yes Greg Koroma MD   QUEtiapine (SEROQUEL) 50 MG tablet TAKE 1 TABLET BY MOUTH EVERY DAY AT NIGHT Yes Greg Koroma MD   metFORMIN (GLUCOPHAGE-XR) 500 MG extended release tablet TAKE 1 TABLET BY MOUTH TWICE A DAY WITH MEALS Yes Greg Koroma MD   Lancets (Jo Hailee) [facility] [id number] Yes Greg Koroma MD   Blood Glucose Monitoring Suppl ([employer]S BLOOD GLUCOSE METER) w/Device KIT 1 each by Does not apply route daily NOTE PHARMACY: ONE TOUCH ULTRA METER Yes CHADWICK Arambula CNP   blood glucose test strips (ACCU-CHEK JUSTO) strip 1 each by In Vitro route daily As needed. Yes CHADWICK Arambula CNP   Blood Glucose Monitoring Suppl (ACCU-CHEK JUSTO PLUS) w/Device KIT Tid Yes Claudia Stephens MD   multivitamin [facility]) per tablet Take 1 tablet by mouth daily.    Yes Historical Provider, MD   aspirin EC 81 MG EC tablet Take 1 tablet by mouth daily  CHADWICK Arambula CNP       Past Medical History:   Diagnosis Date    Depression     Hyperlipidemia, mixed 2018    Internal hemorrhoid     New onset type 2 diabetes mellitus ([facility] [address] 2018    Thyroid nodule        Social History     Tobacco Use    Smoking status: Former Smoker     Packs/day: 1.00     Years: 2.00     Pack years: 2.00     Types: Cigarettes     Start date: 1980     Quit date: 2005     Years since quittin.5    Smokeless tobacco: Never Used   Substance Use Topics    Alcohol use: Not Currently     Comment: social       Family History   Problem Relation Age of Onset    High Cholesterol Mother     Prostate Cancer Father         mets to aorta     Rheum Arthritis Sister        OBJECTIVE:  There were no vitals taken for this visit. GEN:  alert and pleasant , in NAD  HEENT:  NCAT, EOM intact, no facial asymmetry,   NECK:  good range of motion  RR: in NAD over video, normal respiratory rate   EXT: No rash or edema observed over video  NEURO: Alert oriented to person/place/date and time , normal mood and affect, able to follow commands ,  No focal changes over video     ASSESSMENT/PLAN:  1. Adjustment disorder with depressed mood  Doing well on Effexor, continue, refill  - venlafaxine (EFFEXOR XR) 75 MG extended release capsule; 1 tablet po nightly  Dispense: 90 capsule; Refill: 3  - venlafaxine (EFFEXOR XR) 37.5 MG extended release capsule; Take 1 capsule by mouth daily (with breakfast)  Dispense: 90 capsule; Refill: 3    2. Type 2 diabetes, controlled, with neuropathy (HCC)  Stable continue to see Dr. Danny Mora    3. Encounter for medication refill  Meds refill    4. Hyperlipidemia, mixed  Stable  continue medication  - rosuvastatin (CRESTOR) 5 MG tablet; Take 1 tablet by mouth nightly  Dispense: 90 tablet; Refill: 3    5. Screening for colon cancer  Screen  - COLONOSCOPY (Screening); Future    6. Immunity status testing  Screen with next blood draw  - Hepatitis C Antibody; Future  - Hepatitis B Surface Antibody    7. Type 2 diabetes mellitus without complication, without long-term current use of insulin (HCC)  Stable continue meds  Continue to see endo  - rosuvastatin (CRESTOR) 5 MG tablet; Take 1 tablet by mouth nightly  Dispense: 90 tablet;  Refill: 3      Fasting labs and physical this spring/summer with Dr. Jerome Swanson     30 Total Minutes spent pre charting (reviewing problem list, reviewing health maintenance items , meds, any test results, consultant and hospital notes ) and  obtaining present visit history, performing appropriate medical exam/evaluation, counseling and educating the patient (and family), ordering medications ,tests, and procedures as needed, refilling medication(s), placing referral(s) when needed in addition to coordinating care for this patient and documenting in electronic health record

## 2022-02-24 ENCOUNTER — TELEMEDICINE (OUTPATIENT)
Dept: FAMILY MEDICINE CLINIC | Age: 57
End: 2022-02-24
Payer: COMMERCIAL

## 2022-02-24 DIAGNOSIS — E78.2 HYPERLIPIDEMIA, MIXED: ICD-10-CM

## 2022-02-24 DIAGNOSIS — Z01.84 IMMUNITY STATUS TESTING: ICD-10-CM

## 2022-02-24 DIAGNOSIS — Z12.11 SCREENING FOR COLON CANCER: ICD-10-CM

## 2022-02-24 DIAGNOSIS — E11.40 TYPE 2 DIABETES, CONTROLLED, WITH NEUROPATHY (HCC): ICD-10-CM

## 2022-02-24 DIAGNOSIS — E11.9 TYPE 2 DIABETES MELLITUS WITHOUT COMPLICATION, WITHOUT LONG-TERM CURRENT USE OF INSULIN (HCC): ICD-10-CM

## 2022-02-24 DIAGNOSIS — F43.21 ADJUSTMENT DISORDER WITH DEPRESSED MOOD: Primary | ICD-10-CM

## 2022-02-24 DIAGNOSIS — Z76.0 ENCOUNTER FOR MEDICATION REFILL: ICD-10-CM

## 2022-02-24 PROCEDURE — 3017F COLORECTAL CA SCREEN DOC REV: CPT | Performed by: FAMILY MEDICINE

## 2022-02-24 PROCEDURE — 3044F HG A1C LEVEL LT 7.0%: CPT | Performed by: FAMILY MEDICINE

## 2022-02-24 PROCEDURE — 2022F DILAT RTA XM EVC RTNOPTHY: CPT | Performed by: FAMILY MEDICINE

## 2022-02-24 PROCEDURE — G8427 DOCREV CUR MEDS BY ELIG CLIN: HCPCS | Performed by: FAMILY MEDICINE

## 2022-02-24 PROCEDURE — 99214 OFFICE O/P EST MOD 30 MIN: CPT | Performed by: FAMILY MEDICINE

## 2022-02-24 RX ORDER — VENLAFAXINE HYDROCHLORIDE 75 MG/1
CAPSULE, EXTENDED RELEASE ORAL
Qty: 90 CAPSULE | Refills: 3 | Status: SHIPPED | OUTPATIENT
Start: 2022-02-24

## 2022-02-24 RX ORDER — VENLAFAXINE HYDROCHLORIDE 37.5 MG/1
37.5 CAPSULE, EXTENDED RELEASE ORAL
Qty: 90 CAPSULE | Refills: 3 | Status: SHIPPED | OUTPATIENT
Start: 2022-02-24

## 2022-02-24 RX ORDER — ROSUVASTATIN CALCIUM 5 MG/1
5 TABLET, COATED ORAL NIGHTLY
Qty: 90 TABLET | Refills: 3 | Status: SHIPPED | OUTPATIENT
Start: 2022-02-24

## 2022-02-24 SDOH — ECONOMIC STABILITY: FOOD INSECURITY: WITHIN THE PAST 12 MONTHS, THE FOOD YOU BOUGHT JUST DIDN'T LAST AND YOU DIDN'T HAVE MONEY TO GET MORE.: NEVER TRUE

## 2022-02-24 SDOH — ECONOMIC STABILITY: FOOD INSECURITY: WITHIN THE PAST 12 MONTHS, YOU WORRIED THAT YOUR FOOD WOULD RUN OUT BEFORE YOU GOT MONEY TO BUY MORE.: NEVER TRUE

## 2022-02-24 ASSESSMENT — PATIENT HEALTH QUESTIONNAIRE - PHQ9
2. FEELING DOWN, DEPRESSED OR HOPELESS: 0
SUM OF ALL RESPONSES TO PHQ QUESTIONS 1-9: 0
1. LITTLE INTEREST OR PLEASURE IN DOING THINGS: 0
SUM OF ALL RESPONSES TO PHQ QUESTIONS 1-9: 0
SUM OF ALL RESPONSES TO PHQ9 QUESTIONS 1 & 2: 0

## 2022-02-24 ASSESSMENT — SOCIAL DETERMINANTS OF HEALTH (SDOH): HOW HARD IS IT FOR YOU TO PAY FOR THE VERY BASICS LIKE FOOD, HOUSING, MEDICAL CARE, AND HEATING?: NOT HARD AT ALL

## 2022-02-25 RX ORDER — LANCETS 33 GAUGE
EACH MISCELLANEOUS
Qty: 200 EACH | Refills: 5 | Status: SHIPPED | OUTPATIENT
Start: 2022-02-25

## 2022-02-25 NOTE — TELEPHONE ENCOUNTER
Medication:   Requested Prescriptions     Pending Prescriptions Disp Refills    [employer] [address] [facility] [Pharmacy Med Name: [address] LANCETS]  3     Sig: USE AS DIRECTED       Last Filled:  10/22/2021, 100, 3    Patient Phone Number: [phone] (home)     Last appt: 2/24/2022 vv diabetes, josseline  Next appt: Visit date not found    Last Labs DM:   Lab Results   Component Value Date    LABA1C 6.8 01/03/2022

## 2022-04-06 ENCOUNTER — OFFICE VISIT (OUTPATIENT)
Dept: ENDOCRINOLOGY | Age: 57
End: 2022-04-06
Payer: COMMERCIAL

## 2022-04-06 DIAGNOSIS — E11.40 TYPE 2 DIABETES, CONTROLLED, WITH NEUROPATHY (HCC): ICD-10-CM

## 2022-04-06 PROCEDURE — 97802 MEDICAL NUTRITION INDIV IN: CPT | Performed by: DIETITIAN, REGISTERED

## 2022-04-06 RX ORDER — ZINC GLUCONATE 50 MG
50 TABLET ORAL DAILY
COMMUNITY

## 2022-04-06 NOTE — PROGRESS NOTES
Medical Nutrition Therapy for Diabetes    Tram Aragon  April 6, 2022      Patient Care Team:  Jerry Teixeira MD as PCP - General (Family Medicine)  Jerry Teixeira MD as PCP - [facility] Empaneled Provider    Reason for visit: Type 2 Diabetes    ASSESSMENT/PLAN:   NUTRITION DIAGNOSIS  Initial Visit    #1 Problem: Altered Nutrition-Related Laboratory Values (NC-2.2)  Related to: Endocrine/Diabetes   As Evidenced by: Elevated Plasma glucose and/or HgbA1c levels         #2 Problem: Inadequate Carbohydrate Intake  Related to: food and nutrition knowledge deficit regarding controlling blood glucose  As evidenced by: food recall with less than 10-15 g carbohydrate per meal    #3 Problem: Fluids-NI-3.1                      Inadequate fluids    NUTRITION INTERVENTION  Nutrition Prescription: 30 grams carbohydrate per meal with protein and non-starch vegetables  15 gram carbohydrate snacks    Diabetes Education/Counseling included: Pathophysiology of Diabetes  Carbohydrate Control, Activity/exercise, Label-reading, Monitoring and Medications, Benefits of adequate hydration, quality sleep and stress management    Interventions:  Control Carbohydrate Intake using Plate Guide, Control Carbohydrate Intake using Carb Counting, Increase intake of whole grains, Decrease intake of foods high in saturated fat and Increase activity level by walking  Recommended 64 ounces water daily. Suggested 30 gram carbohydrate meals and 15 gram carbohydrate snacks. Encouraged increasing activity.   Handouts: Healthy Eating Guidelines for Diabetes-[employer], Sample menus-[employer], Planning Healthy Meals-[employer]  NUTRITION MONITORING AND EVALUATION  3 meals and 3 snacks  30 gram carbohydrate meals  Increase water       Patient Active Problem List   Diagnosis    Internal hemorrhoid    Adjustment disorder with depressed mood    Vitamin D deficiency    Hyperlipidemia, mixed    Thyroid nodule    Primary insomnia    Multinodular goiter  Class 2 obesity with body mass index (BMI) of 39.0 to 39.9 in adult    Type 2 diabetes, controlled, with neuropathy (HCC)    Menopause    Alopecia       Current Outpatient Medications   Medication Sig Dispense Refill    OneTouch Delica Lancets 46I MISC Check blood sugars 2-3 times a day 200 each 5    venlafaxine (EFFEXOR XR) 75 MG extended release capsule 1 tablet po nightly 90 capsule 3    venlafaxine (EFFEXOR XR) 37.5 MG extended release capsule Take 1 capsule by mouth daily (with breakfast) 90 capsule 3    rosuvastatin (CRESTOR) 5 MG tablet Take 1 tablet by mouth nightly 90 tablet 3    Semaglutide,0.25 or 0.5MG/DOS, (OZEMPIC, 0.25 OR 0.5 MG/DOSE,) 2 MG/1.5ML SOPN 0.25 mg weekly for 4 weeks, then 0.5 mg weekly 3 pen 3    ONETOUCH ULTRA strip TEST DAILY 100 strip 5    QUEtiapine (SEROQUEL) 50 MG tablet TAKE 1 TABLET BY MOUTH EVERY DAY AT NIGHT 90 tablet 1    metFORMIN (GLUCOPHAGE-XR) 500 MG extended release tablet TAKE 1 TABLET BY MOUTH TWICE A DAY WITH MEALS 180 tablet 1    Blood Glucose Monitoring Suppl ([employer] BLOOD GLUCOSE METER) w/Device KIT 1 each by Does not apply route daily NOTE PHARMACY: ONE TOUCH ULTRA METER 1 kit 0    blood glucose test strips (ACCU-CHEK JUSTO) strip 1 each by In Vitro route daily As needed. 100 each 3    Blood Glucose Monitoring Suppl (ACCU-CHEK JUSTO PLUS) w/Device KIT Tid 1 kit 0    aspirin EC 81 MG EC tablet Take 1 tablet by mouth daily 90 tablet 3    multivitamin (THERAGRAN) per tablet Take 1 tablet by mouth daily. No current facility-administered medications for this visit.          NUTRITION ASSESSMENT    Biochemical Data:    Lab Results   Component Value Date    LABA1C 6.8 (H) 01/03/2022     Lab Results   Component Value Date    .9 11/04/2020       Lab Results   Component Value Date    CHOL 186 01/03/2022    CHOL 276 (H) 11/04/2020    CHOL 231 (H) 04/06/2018     Lab Results   Component Value Date    TRIG 201 (H) 01/03/2022    TRIG 258 (H) 11/04/2020    TRIG 190 (H) 04/06/2018     Lab Results   Component Value Date    HDL 51 01/03/2022    HDL 50 11/04/2020    HDL 43 06/25/2018     Lab Results   Component Value Date    LDLCALC 101 (H) 01/03/2022    LDLCALC 174 (H) 11/04/2020    LDLCALC 97 06/25/2018     Lab Results   Component Value Date    LABVLDL 34 01/03/2022    LABVLDL 52 11/04/2020    LABVLDL 37 06/25/2018     No results found for: CHOLHDLRATIO    Lab Results   Component Value Date    WBC 5.2 01/03/2022    HGB 13.3 01/03/2022    HCT 39.8 01/03/2022    MCV 87 01/03/2022     01/03/2022       Lab Results   Component Value Date    CREATININE 0.73 01/03/2022    BUN 11 01/03/2022     01/03/2022    K 4.5 01/03/2022     01/03/2022    CO2 24 01/03/2022       Diabetes Medications: Yes  Knows name and dose of prescribed medications Yes  Knows prescribed schedule for medicationsYes  Recent change in medication type/dosage: Yes  Stores  medications properlyYes  Comments:     Monitoring:   Has BG meter: Yes  Testing frequency: 1  Recent results: -160, 1 hour after meal in afternoon 150-160, 200 rarely  Hypoglycemia? No    Anthropometric Measurements:   Wt:   Wt Readings from Last 3 Encounters:   02/01/22 240 lb (108.9 kg)   11/01/21 222 lb (100.7 kg)   11/04/20 235 lb (106.6 kg)      BMI:   BMI Readings from Last 3 Encounters:   02/01/22 39.94 kg/m²   11/01/21 36.94 kg/m²   11/04/20 39.11 kg/m²     Patient's stated goal weight:   7% Weight loss goal weight:     Physical Activity History:   Physical activity: walking w/ goal of 10,000 steps  Frequency of activity: daily  Duration of activity: maybe 5-8,000 steps  Obstacles to activity: none    Sleep: good, interrupted 1 time, no bedtime, unsure if snores    Food and Nutrition History:   Nutrition Awareness/Previous DSMES: No  Number of people in household: 1  Frequency of Meals Eaten away from home:1/week  Food Availability Problems  Within the past 12 months, have you worried that your food would run out before you got money to buy more? No  Within the past 12 months, has the food you bought not lasted till the end of the month and you didn't have money to get more? No  Beverage consumption: water-w/karson - 4, milk not daily -2 cups, coffee-2-3 cups, iced tea - makes in the summer  Alcohol consumption: rarely-every other month addie  Usual Food consumption:   7-8 am Kefer or Kefer shake w/ protein  Sometimes wait to 10 am bread butter and jelly/eggs, 2 toast  Snack mozzarella stick  12:30-1 pm granola, yogurt,  Snacks grapes  5-6 pm Hamburger steak, broccoli, kris slaw, bread    Barriers:   -none          Follow Up Plan: 2-3 months Will remain available. Contact information given.     Referring Provider: Lachelle Rubin MD  Time spent with patient: 60 minutes

## 2022-04-25 RX ORDER — QUETIAPINE FUMARATE 50 MG/1
TABLET, FILM COATED ORAL
Qty: 90 TABLET | Refills: 1 | Status: SHIPPED | OUTPATIENT
Start: 2022-04-25 | End: 2022-08-16

## 2022-04-28 DIAGNOSIS — E11.9 TYPE 2 DIABETES MELLITUS WITHOUT COMPLICATIONS (HCC): ICD-10-CM

## 2022-04-28 RX ORDER — METFORMIN HYDROCHLORIDE 500 MG/1
TABLET, EXTENDED RELEASE ORAL
Qty: 180 TABLET | Refills: 1 | Status: SHIPPED | OUTPATIENT
Start: 2022-04-28 | End: 2022-08-16

## 2022-04-28 NOTE — TELEPHONE ENCOUNTER
lov 2/24/22  lrf 180 1 10/22/21   Lab Results   Component Value Date    LABA1C 6.8 (H) 01/03/2022     Lab Results   Component Value Date    .9 11/04/2020 None

## 2022-07-08 DIAGNOSIS — E11.9 CONTROLLED TYPE 2 DIABETES MELLITUS WITHOUT COMPLICATION, WITHOUT LONG-TERM CURRENT USE OF INSULIN (HCC): ICD-10-CM

## 2022-07-08 RX ORDER — SEMAGLUTIDE 1.34 MG/ML
INJECTION, SOLUTION SUBCUTANEOUS
Qty: 2 PEN | Refills: 0 | Status: SHIPPED | OUTPATIENT
Start: 2022-07-08 | End: 2022-08-30

## 2022-07-12 ENCOUNTER — TELEPHONE (OUTPATIENT)
Dept: FAMILY MEDICINE CLINIC | Age: 57
End: 2022-07-12

## 2022-07-12 NOTE — TELEPHONE ENCOUNTER
Pt call and ask can she have a prescription for her sinus infection it been going on for a week now.  Pt states she does not need anything super heavy

## 2022-07-13 ENCOUNTER — TELEMEDICINE (OUTPATIENT)
Dept: FAMILY MEDICINE CLINIC | Age: 57
End: 2022-07-13
Payer: COMMERCIAL

## 2022-07-13 DIAGNOSIS — Z78.0 MENOPAUSE: ICD-10-CM

## 2022-07-13 DIAGNOSIS — E11.9 CONTROLLED TYPE 2 DIABETES MELLITUS WITHOUT COMPLICATION, WITHOUT LONG-TERM CURRENT USE OF INSULIN (HCC): ICD-10-CM

## 2022-07-13 DIAGNOSIS — Z01.84 IMMUNITY STATUS TESTING: ICD-10-CM

## 2022-07-13 DIAGNOSIS — J06.9 PROTRACTED URI: Primary | ICD-10-CM

## 2022-07-13 DIAGNOSIS — E11.9 TYPE 2 DIABETES MELLITUS WITHOUT COMPLICATION, WITHOUT LONG-TERM CURRENT USE OF INSULIN (HCC): ICD-10-CM

## 2022-07-13 DIAGNOSIS — L65.9 ALOPECIA: ICD-10-CM

## 2022-07-13 DIAGNOSIS — E04.2 MULTINODULAR GOITER: ICD-10-CM

## 2022-07-13 LAB
A/G RATIO: 2.7 (ref 1.1–2.2)
ALBUMIN SERPL-MCNC: 4.6 G/DL (ref 3.4–5)
ALP BLD-CCNC: 93 U/L (ref 40–129)
ALT SERPL-CCNC: 15 U/L (ref 10–40)
ANION GAP SERPL CALCULATED.3IONS-SCNC: 12 MMOL/L (ref 3–16)
ANION GAP SERPL CALCULATED.3IONS-SCNC: 14 MMOL/L (ref 3–16)
ANTI-THYROGLOB ABS: 18 IU/ML
AST SERPL-CCNC: 15 U/L (ref 15–37)
BASOPHILS ABSOLUTE: 0 K/UL (ref 0–0.2)
BASOPHILS RELATIVE PERCENT: 0.3 %
BILIRUB SERPL-MCNC: 0.3 MG/DL (ref 0–1)
BUN BLDV-MCNC: 11 MG/DL (ref 7–20)
BUN BLDV-MCNC: 12 MG/DL (ref 7–20)
CALCIUM SERPL-MCNC: 9 MG/DL (ref 8.3–10.6)
CALCIUM SERPL-MCNC: 9.2 MG/DL (ref 8.3–10.6)
CHLORIDE BLD-SCNC: 100 MMOL/L (ref 99–110)
CHLORIDE BLD-SCNC: 101 MMOL/L (ref 99–110)
CHOLESTEROL, TOTAL: 195 MG/DL (ref 0–199)
CO2: 25 MMOL/L (ref 21–32)
CO2: 27 MMOL/L (ref 21–32)
CREAT SERPL-MCNC: 0.6 MG/DL (ref 0.6–1.1)
CREAT SERPL-MCNC: 0.7 MG/DL (ref 0.6–1.1)
EOSINOPHILS ABSOLUTE: 0.1 K/UL (ref 0–0.6)
EOSINOPHILS RELATIVE PERCENT: 1.2 %
ESTRADIOL LEVEL: 35 PG/ML
FOLLICLE STIMULATING HORMONE: 22.1 MIU/ML
GFR AFRICAN AMERICAN: >60
GFR AFRICAN AMERICAN: >60
GFR NON-AFRICAN AMERICAN: >60
GFR NON-AFRICAN AMERICAN: >60
GLUCOSE BLD-MCNC: 111 MG/DL (ref 70–99)
GLUCOSE BLD-MCNC: 113 MG/DL (ref 70–99)
HCT VFR BLD CALC: 37.7 % (ref 36–48)
HDLC SERPL-MCNC: 39 MG/DL (ref 40–60)
HEMOGLOBIN: 12.4 G/DL (ref 12–16)
HEPATITIS C ANTIBODY INTERPRETATION: NORMAL
LDL CHOLESTEROL CALCULATED: 125 MG/DL
LUTEINIZING HORMONE: 25.8 MIU/ML
LYMPHOCYTES ABSOLUTE: 1.5 K/UL (ref 1–5.1)
LYMPHOCYTES RELATIVE PERCENT: 27.2 %
MCH RBC QN AUTO: 29.4 PG (ref 26–34)
MCHC RBC AUTO-ENTMCNC: 33 G/DL (ref 31–36)
MCV RBC AUTO: 89.3 FL (ref 80–100)
MONOCYTES ABSOLUTE: 0.4 K/UL (ref 0–1.3)
MONOCYTES RELATIVE PERCENT: 6.6 %
NEUTROPHILS ABSOLUTE: 3.6 K/UL (ref 1.7–7.7)
NEUTROPHILS RELATIVE PERCENT: 64.7 %
PDW BLD-RTO: 14.4 % (ref 12.4–15.4)
PLATELET # BLD: 315 K/UL (ref 135–450)
PMV BLD AUTO: 8.5 FL (ref 5–10.5)
POTASSIUM SERPL-SCNC: 4 MMOL/L (ref 3.5–5.1)
POTASSIUM SERPL-SCNC: 4.2 MMOL/L (ref 3.5–5.1)
RBC # BLD: 4.22 M/UL (ref 4–5.2)
SODIUM BLD-SCNC: 139 MMOL/L (ref 136–145)
SODIUM BLD-SCNC: 140 MMOL/L (ref 136–145)
T3 FREE: 2.9 PG/ML (ref 2.3–4.2)
T4 FREE: 1.1 NG/DL (ref 0.9–1.8)
THYROID PEROXIDASE (TPO) ABS: 13 IU/ML
TOTAL PROTEIN: 6.3 G/DL (ref 6.4–8.2)
TRIGL SERPL-MCNC: 155 MG/DL (ref 0–150)
TSH SERPL DL<=0.05 MIU/L-ACNC: 1.94 UIU/ML (ref 0.27–4.2)
VLDLC SERPL CALC-MCNC: 31 MG/DL
WBC # BLD: 5.5 K/UL (ref 4–11)

## 2022-07-13 PROCEDURE — 99212 OFFICE O/P EST SF 10 MIN: CPT | Performed by: FAMILY MEDICINE

## 2022-07-13 PROCEDURE — 1036F TOBACCO NON-USER: CPT | Performed by: FAMILY MEDICINE

## 2022-07-13 PROCEDURE — 3017F COLORECTAL CA SCREEN DOC REV: CPT | Performed by: FAMILY MEDICINE

## 2022-07-13 PROCEDURE — G8417 CALC BMI ABV UP PARAM F/U: HCPCS | Performed by: FAMILY MEDICINE

## 2022-07-13 PROCEDURE — G8427 DOCREV CUR MEDS BY ELIG CLIN: HCPCS | Performed by: FAMILY MEDICINE

## 2022-07-13 RX ORDER — FLUCONAZOLE 100 MG/1
100 TABLET ORAL ONCE
Qty: 1 TABLET | Refills: 0 | Status: SHIPPED | OUTPATIENT
Start: 2022-07-13 | End: 2022-07-13

## 2022-07-13 RX ORDER — AMOXICILLIN 500 MG/1
500 CAPSULE ORAL 3 TIMES DAILY
Qty: 21 CAPSULE | Refills: 0 | Status: SHIPPED | OUTPATIENT
Start: 2022-07-13 | End: 2022-07-20

## 2022-07-13 NOTE — PROGRESS NOTES
Cesia Alcocer is a 56 y.o. female. HPI:  Cesia Alcocer, was evaluated through a synchronous (real-time) audio-video encounter. The patient (or guardian if applicable) is aware that this is a billable service, which includes applicable co-pays. This Virtual Visit was conducted with patient's (and/or legal guardian's) consent. The visit was conducted pursuant to the emergency declaration under the [address] [place] [id number], [address] [place] [address] authority and the [employer] Act. Patient identification was verified, and a caregiver was present when appropriate. The patient was located in a state where the provider was licensed to provide care. --Jorge L Morgan MD on 7/13/2022 at 12:47 PM    An electronic signature was used to authenticate this note. Video visit for ongoing sinus congestion, tested negative for COVID  No fever   Sinus pressure and thick nasal discharge  Chest is okay  Continuing to work but just tired with sinus pressure, requesting antibiotic    Recent COVID test is negative       Wt Readings from Last 3 Encounters:   02/01/22 240 lb (108.9 kg)   11/01/21 222 lb (100.7 kg)   11/04/20 235 lb (106.6 kg)       REVIEW OF SYSTEMS:   CONSTITUTIONAL: See history of present illness,   Weight noted   HEENT: No new vision difficulties or ringing in the ears. See HPI  RESPIRATORY: No new SOB, PND, orthopnea or cough. CARDIOVASCULAR: no CP, palpitations or SOB with exertion  GI: No nausea, vomiting, diarrhea, constipation, abdominal pain or changes in bowel habits. : No urinary frequency, urgency, incontinence hematuria or dysuria. SKIN: No cyanosis or skin lesions. MUSCULOSKELETAL: No new muscle or joint pain. NEUROLOGICAL: No syncope or TIA-like symptoms. PSYCHIATRIC: No anxiety, insomnia or depression     No Known Allergies    Prior to Visit Medications    Medication Sig Taking?  Authorizing Provider   amoxicillin (AMOXIL) 500 MG capsule Take 1 capsule by mouth 3 times daily for 7 days Yes Lisette Michael MD   fluconazole (DIFLUCAN) 100 MG tablet Take 1 tablet by mouth once for 1 dose Yes Lisette Michael MD   Semaglutide,0.25 or 0.5MG/DOS, (OZEMPIC, 0.25 OR 0.5 MG/DOSE,) 2 MG/1.5ML SOPN Inject 0.5 mg weekly Yes Uday Jackson MD   metFORMIN (GLUCOPHAGE-XR) 500 MG extended release tablet TAKE 1 TABLET BY MOUTH TWICE A DAY WITH MEALS Yes Lisette Michael MD   QUEtiapine (SEROQUEL) 50 MG tablet TAKE 1 TABLET BY MOUTH EVERY DAY AT NIGHT Yes Lisette Michael MD   Cholecalciferol (VITAMIN D3) 125 MCG (5000 UT) TABS Take 5,000 Units by mouth Yes Historical Provider, MD   zinc gluconate 50 MG tablet Take 50 mg by mouth daily Yes Historical Provider, MD   OneTouch Delica Lancets 74T MISC Check blood sugars 2-3 times a day Yes Lisette Michael MD   venlafaxine (EFFEXOR XR) 75 MG extended release capsule 1 tablet po nightly Yes Lisette Michael MD   venlafaxine (EFFEXOR XR) 37.5 MG extended release capsule Take 1 capsule by mouth daily (with breakfast) Yes Lisette Michael MD   rosuvastatin (CRESTOR) 5 MG tablet Take 1 tablet by mouth nightly  Patient taking differently: Take 5 mg by mouth nightly Indications: Takes every other day. Yes Lisette Michael MD   ONETOUCH ULTRA strip TEST DAILY Yes Lisette Michael MD   Blood Glucose Monitoring Suppl ([employer] BLOOD GLUCOSE METER) w/Device KIT 1 each by Does not apply route daily NOTE PHARMACY: ONE TOUCH ULTRA METER Yes CHADWICK Jenkins CNP   blood glucose test strips (ACCU-CHEK JUSTO) strip 1 each by In Vitro route daily As needed.  Yes CHADWICK Jenkins CNP   Blood Glucose Monitoring Suppl (ACCU-CHEK JUSTO PLUS) w/Device KIT Tid Yes Parveen Wilder MD   multivitamin [facility]) per tablet Take 1 tablet by mouth daily   Yes Historical Provider, MD   aspirin EC 81 MG EC tablet Take 1 tablet by mouth daily  CHADWICK Jenkins CNP       Past Medical History:   Diagnosis Date    Depression     Hyperlipidemia, mixed 2018    Internal hemorrhoid     New onset type 2 diabetes mellitus ([facility] [address] 2018    Thyroid nodule        Social History     Tobacco Use    Smoking status: Former Smoker     Packs/day: 1.00     Years: 2.00     Pack years: 2.00     Types: Cigarettes     Start date: 1980     Quit date: 2005     Years since quittin.9    Smokeless tobacco: Never Used   Substance Use Topics    Alcohol use: Not Currently     Comment: social       Family History   Problem Relation Age of Onset    High Cholesterol Mother     Prostate Cancer Father         mets to aorta     Rheum Arthritis Sister        OBJECTIVE:  There were no vitals taken for this visit. GEN:  alert and pleasant , in NAD, sinus congestion over video  HEENT:  NCAT, EOM intact, no facial asymmetry,   NECK:  good range of motion  RR: in NAD over video, normal respiratory rate   EXT: No rash or edema observed over video  NEURO: Alert oriented to person/place/date and time , normal mood and affect, able to follow commands ,  No focal changes over video     ASSESSMENT/PLAN:  1.  Protracted URI  Amoxicillin 500 mg 3 times a day take with food water probiotic  Flonase nasal spray and Claritin  Due for diabetic check, had labs drawn today  Follow-up if symptoms persist or worsen and for routine care with Dr. Gadiel Bryant      15 Total Minutes spent pre charting (reviewing problem list, reviewing health maintenance items , meds, any test results, consultant and hospital notes ) and  obtaining present visit history, performing appropriate medical exam/evaluation, counseling and educating the patient (and family), ordering medications ,tests, and procedures as needed, refilling medication(s), placing referral(s) when needed in addition to coordinating care for this patient and documenting in electronic health record

## 2022-07-14 LAB
ESTIMATED AVERAGE GLUCOSE: 122.6 MG/DL
HBA1C MFR BLD: 5.9 %

## 2022-07-15 LAB
C-PEPTIDE: 3 NG/ML (ref 1.1–4.4)
SEX HORMONE BINDING GLOBULIN: 28 NMOL/L (ref 30–135)
TESTOSTERONE FREE-NONMALE: 7.7 PG/ML (ref 0.6–3.8)
TESTOSTERONE TOTAL: 39 NG/DL (ref 20–70)

## 2022-07-17 PROBLEM — E66.812 CLASS 2 SEVERE OBESITY WITH SERIOUS COMORBIDITY AND BODY MASS INDEX (BMI) OF 39.0 TO 39.9 IN ADULT: Status: ACTIVE | Noted: 2022-07-17

## 2022-07-17 PROBLEM — E66.01 CLASS 2 SEVERE OBESITY WITH SERIOUS COMORBIDITY AND BODY MASS INDEX (BMI) OF 39.0 TO 39.9 IN ADULT (HCC): Status: ACTIVE | Noted: 2022-07-17

## 2022-07-18 ENCOUNTER — OFFICE VISIT (OUTPATIENT)
Dept: ENDOCRINOLOGY | Age: 57
End: 2022-07-18
Payer: COMMERCIAL

## 2022-07-18 VITALS
OXYGEN SATURATION: 98 % | WEIGHT: 224 LBS | DIASTOLIC BLOOD PRESSURE: 76 MMHG | HEIGHT: 65 IN | TEMPERATURE: 98 F | SYSTOLIC BLOOD PRESSURE: 122 MMHG | RESPIRATION RATE: 14 BRPM | HEART RATE: 105 BPM | BODY MASS INDEX: 37.32 KG/M2

## 2022-07-18 DIAGNOSIS — L65.9 ALOPECIA: ICD-10-CM

## 2022-07-18 DIAGNOSIS — Z78.0 MENOPAUSE: ICD-10-CM

## 2022-07-18 DIAGNOSIS — E55.9 VITAMIN D DEFICIENCY: ICD-10-CM

## 2022-07-18 DIAGNOSIS — E11.9 CONTROLLED TYPE 2 DIABETES MELLITUS WITHOUT COMPLICATION, WITHOUT LONG-TERM CURRENT USE OF INSULIN (HCC): Primary | ICD-10-CM

## 2022-07-18 DIAGNOSIS — E78.2 HYPERLIPIDEMIA, MIXED: ICD-10-CM

## 2022-07-18 DIAGNOSIS — E04.1 THYROID NODULE: ICD-10-CM

## 2022-07-18 DIAGNOSIS — E66.01 CLASS 2 SEVERE OBESITY WITH SERIOUS COMORBIDITY AND BODY MASS INDEX (BMI) OF 39.0 TO 39.9 IN ADULT, UNSPECIFIED OBESITY TYPE (HCC): ICD-10-CM

## 2022-07-18 LAB — DHEAS (DHEA SULFATE): 162 UG/DL (ref 26–200)

## 2022-07-18 PROCEDURE — 1036F TOBACCO NON-USER: CPT | Performed by: INTERNAL MEDICINE

## 2022-07-18 PROCEDURE — G8427 DOCREV CUR MEDS BY ELIG CLIN: HCPCS | Performed by: INTERNAL MEDICINE

## 2022-07-18 PROCEDURE — 2022F DILAT RTA XM EVC RTNOPTHY: CPT | Performed by: INTERNAL MEDICINE

## 2022-07-18 PROCEDURE — 3017F COLORECTAL CA SCREEN DOC REV: CPT | Performed by: INTERNAL MEDICINE

## 2022-07-18 PROCEDURE — 3044F HG A1C LEVEL LT 7.0%: CPT | Performed by: INTERNAL MEDICINE

## 2022-07-18 PROCEDURE — 99215 OFFICE O/P EST HI 40 MIN: CPT | Performed by: INTERNAL MEDICINE

## 2022-07-18 PROCEDURE — G8417 CALC BMI ABV UP PARAM F/U: HCPCS | Performed by: INTERNAL MEDICINE

## 2022-07-18 RX ORDER — ASCORBIC ACID 500 MG
500 TABLET ORAL DAILY
COMMUNITY

## 2022-07-18 RX ORDER — PHENTERMINE HYDROCHLORIDE 37.5 MG/1
37.5 TABLET ORAL
Qty: 30 TABLET | Refills: 0 | Status: SHIPPED | OUTPATIENT
Start: 2022-07-18 | End: 2022-07-18

## 2022-07-18 RX ORDER — PHENTERMINE HYDROCHLORIDE 37.5 MG/1
37.5 TABLET ORAL
Qty: 30 TABLET | Refills: 0 | Status: SHIPPED | OUTPATIENT
Start: 2022-07-18 | End: 2022-08-18

## 2022-07-18 NOTE — PROGRESS NOTES
Marshall Kehr is a 56 y.o. female who is being evaluated for Type 2 diabetes mellitus. Current symptoms/problems include none and show no change. Controlled type 2 diabetes mellitus without complication, without long-term current use of insulin ([facility] [E11.9]    Diagnosed with Type 2 diabetes mellitus in 2014. Comorbid conditions:  hyperlipidemia and Neuropathy    Current diabetic medications include: Metformin  mg bid    Intolerance to diabetes medications: No     Weight trend: stable  Prior visit with dietician: yes  Current diet: on average, 3 meals per day  Current exercise: walking     Current monitoring regimen: home blood tests - 1 times daily  Has brought blood glucose log/meter:  No  Home blood sugar records: fasting range: 150 and postprandial range: 170-180   Any episodes of hypoglycemia? No  Hypoglycemia frequency and time(s):    Does patient have Glucagon emergency kit? No  Does patient have rapid acting carbohydrate? No  Does patient wear a medic alert bracelet or necklace? No    2. Multinodular goiter  This started in 2016. Patient was diagnosed with thyroid nodule. The problem has been unchanged. Previous thyroid studies include: TSH and free thyroxine. Patient started medication in N/A. Currently patient is on: N/A. Misses  N/A doses a month. Current complaints: fatigue, brain fog, difficulty loosing weight  Right 3.1 cm and 0.8 cm thyroid nodules  FNA of right 3.1 cm nodule nondiagnostic  Had F[place] in [place]  Report not available    History of obstructive symptoms: difficulty swallowing No, changes in voice/hoarseness No.  History of radiation to patient's neck: No  Resent iodine exposure: No  Family history includes mother thyroid nodules  Family history of thyroid cancer: No    3. Obesity  Eats healthy, walks  Tried weight watchers several times  Not successful with weight watchers    4. Hyperlipidemia, mixed  No muscle pain    5. Vitamin D deficiency  Has fatigue    6. Menopause  No premature menopause   Has some sweating    7. Alopecia  Diffuse hair loss      THYROID ULTRASOUND   History : nodule   Comparison : none       COMMENTS :    Thyroid size : Right lobe 5.1 x 2.3 x 2.1 cm                                     Left lobe 4.6 x 1.3 x 1.4 cm   Echotexture : normal   Nodules : 3.1 x 2.3 x 1.7 cm mixed cystic/solid nodule in the right upper gland. The solid components demonstrate color Doppler flow. 8 x 8 x 8 mm hypoechoic nodule in the right inferior gland. Impression   IMPRESSION :       Cystic and solid 3.1 cm nodule in the right upper gland for which biopsy is recommended (ACR TR 3). Completed:     01/18/2022   Perform Phys:  Noelle Chambers MD   FINAL DIAGNOSIS:     Thyroid, Right Fine Needle Aspiration:   Non-Diagnostic aspiration specimen. No epithelial component identified. Numerous macrophages and debris present. See case comment. COMMENT:    Insufficient follicular epithelial cells are available for   cytologic evaluation. However, the specimen contains abundant   macrophages with debris and would be supportive of aspiration of a cystic   lesion in the appropriate clinical setting. Clinical correlation is   recommended. .................     ISENC/ISENC       Past Medical History:   Diagnosis Date    Depression     Hyperlipidemia, mixed 4/11/2018    Internal hemorrhoid     New onset type 2 diabetes mellitus ([facility] 4/11/2018    Thyroid nodule       Patient Active Problem List   Diagnosis    Internal hemorrhoid    Adjustment disorder with depressed mood    Vitamin D deficiency    Hyperlipidemia, mixed    Thyroid nodule    Primary insomnia    Multinodular goiter    Class 2 obesity with body mass index (BMI) of 39.0 to 39.9 in adult    Menopause    Alopecia    Class 2 severe obesity with serious comorbidity and body mass index (BMI) of 39.0 to 39.9 in adult [facility])     Past Surgical History:   Procedure Laterality Date    ENDOMETRIAL ABLATION       Social History     Socioeconomic History    Marital status: Single     Spouse name: Not on file    Number of children: Not on file    Years of education: Not on file    Highest education level: Not on file   Occupational History    Not on file   Tobacco Use    Smoking status: Former     Packs/day: 1.00     Years: 2.00     Pack years: 2.00     Types: Cigarettes     Start date: 1980     Quit date: 2005     Years since quittin.9    Smokeless tobacco: Never   Substance and Sexual Activity    Alcohol use: Not Currently     Comment: social    Drug use: No    Sexual activity: Not on file   Other Topics Concern    Not on file   Social History Narrative    Not on file     Social Determinants of Health     Financial Resource Strain: Low Risk     Difficulty of Paying Living Expenses: Not hard at all   Food Insecurity: No Food Insecurity    Worried About Running Out of Food in the Last Year: Never true    Ran Out of Food in the Last Year: Never true   Transportation Needs: Not on file   Physical Activity: Not on file   Stress: Not on file   Social Connections: Not on file   Intimate Partner Violence: Not on file   Housing Stability: Not on file     Family History   Problem Relation Age of Onset    High Cholesterol Mother     Prostate Cancer Father         mets to aorta     Rheum Arthritis Sister      Current Outpatient Medications   Medication Sig Dispense Refill    vitamin C (ASCORBIC ACID) 500 MG tablet Take 500 mg by mouth in the morning. phentermine (ADIPEX-P) 37.5 MG tablet Take 1 tablet by mouth every morning (before breakfast) for 30 days.  30 tablet 0    amoxicillin (AMOXIL) 500 MG capsule Take 1 capsule by mouth 3 times daily for 7 days 21 capsule 0    Semaglutide,0.25 or 0.5MG/DOS, (OZEMPIC, 0.25 OR 0.5 MG/DOSE,) 2 MG/1.5ML SOPN Inject 0.5 mg weekly 2 pen 0    metFORMIN (GLUCOPHAGE-XR) 500 MG extended release tablet TAKE 1 TABLET BY MOUTH TWICE A DAY WITH MEALS 180 tablet 1 QUEtiapine (SEROQUEL) 50 MG tablet TAKE 1 TABLET BY MOUTH EVERY DAY AT NIGHT 90 tablet 1    Cholecalciferol (VITAMIN D3) 125 MCG (5000 UT) TABS Take 5,000 Units by mouth      zinc gluconate 50 MG tablet Take 50 mg by mouth daily      OneTouch Delica Lancets 74T MISC Check blood sugars 2-3 times a day 200 each 5    venlafaxine (EFFEXOR XR) 75 MG extended release capsule 1 tablet po nightly 90 capsule 3    venlafaxine (EFFEXOR XR) 37.5 MG extended release capsule Take 1 capsule by mouth daily (with breakfast) 90 capsule 3    rosuvastatin (CRESTOR) 5 MG tablet Take 1 tablet by mouth nightly (Patient taking differently: Take 5 mg by mouth nightly Indications: Takes every other day.) 90 tablet 3    ONETOUCH ULTRA strip TEST DAILY 100 strip 5    Blood Glucose Monitoring Suppl ([employer] BLOOD GLUCOSE METER) w/Device KIT 1 each by Does not apply route daily NOTE PHARMACY: ONE TOUCH ULTRA METER 1 kit 0    blood glucose test strips (ACCU-CHEK JUSTO) strip 1 each by In Vitro route daily As needed. 100 each 3    Blood Glucose Monitoring Suppl (ACCU-CHEK JUSTO PLUS) w/Device KIT Tid 1 kit 0    aspirin EC 81 MG EC tablet Take 1 tablet by mouth daily 90 tablet 3    multivitamin (THERAGRAN) per tablet Take 1 tablet by mouth daily   (Patient not taking: Reported on 2022)       No current facility-administered medications for this visit.      No Known Allergies  Family Status   Relation Name Status    Mother  Alive    Father   at age 80        aortic carcinoma    Sister  Alive       Lab Review:    Lab Results   Component Value Date/Time    WBC 5.5 2022 08:24 AM    HGB 12.4 2022 08:24 AM    HCT 37.7 2022 08:24 AM    MCV 89.3 2022 08:24 AM     2022 08:24 AM     Lab Results   Component Value Date/Time     2022 08:26 AM    K 4.0 2022 08:26 AM     2022 08:26 AM    CO2 25 2022 08:26 AM    BUN 11 2022 08:26 AM    CREATININE 0.7 2022 08:26 AM GLUCOSE 113 07/13/2022 08:26 AM    CALCIUM 9.2 07/13/2022 08:26 AM    PROT 6.3 07/13/2022 08:24 AM    PROT 7.2 09/06/2011 09:26 AM    LABALBU 4.6 07/13/2022 08:24 AM    BILITOT 0.3 07/13/2022 08:24 AM    ALKPHOS 93 07/13/2022 08:24 AM    AST 15 07/13/2022 08:24 AM    ALT 15 07/13/2022 08:24 AM    LABGLOM >60 07/13/2022 08:26 AM    GFRAA >60 07/13/2022 08:26 AM    GFRAA >60 09/06/2011 09:26 AM    AGRATIO 2.7 07/13/2022 08:24 AM    GLOB 2.5 01/03/2022 08:13 AM     Lab Results   Component Value Date/Time    TSH 1.94 07/13/2022 08:24 AM    FT3 2.9 07/13/2022 08:26 AM     Lab Results   Component Value Date/Time    LABA1C 5.9 07/13/2022 08:24 AM     Lab Results   Component Value Date/Time    .6 07/13/2022 08:24 AM     Lab Results   Component Value Date/Time    CHOL 195 07/13/2022 08:24 AM     Lab Results   Component Value Date/Time    TRIG 155 07/13/2022 08:24 AM     Lab Results   Component Value Date/Time    HDL 39 07/13/2022 08:24 AM    HDL 52 09/06/2011 09:26 AM     Lab Results   Component Value Date/Time    LDLCALC 125 07/13/2022 08:24 AM     Lab Results   Component Value Date/Time    LABVLDL 31 07/13/2022 08:24 AM     No results found for: CHOLHDLRATIO  No results found for: LABMICR, [id number]  Lab Results   Component Value Date/Time    VITD25 26 09/06/2011 09:26 AM        Review of Systems:  Constitutional: has fatigue, no fever, no recent weight gain, no recent weight loss, no changes in appetite  Eyes: no eye pain, no change in vision, no eye redness, no eye irritation, no double vision  Ears, nose, throat: no nasal congestion, no sore throat, no earache, no decrease in hearing, no hoarseness, no dry mouth, no sinus problems, no difficulty swallowing, no neck lumps, no dental problems, no mouth sores, no ringing in ears  Pulmonary: no shortness of breath, no wheezing, no dyspnea on exertion, no cough  Cardiovascular: no chest pain, no lower extremity edema, no orthopnea, no intermittent leg claudication, no palpitations  Gastrointestinal: no abdominal pain, no nausea, no vomiting, no diarrhea, no constipation, no dysphagia, no heartburn, no bloating  Genitourinary: no dysuria, no urinary incontinence, no urinary hesitancy, has urinary frequency, no feelings of urinary urgency, no nocturia  Musculoskeletal: no joint swelling, no joint stiffness, no joint pain, no muscle cramps, no muscle pain, no bone pain  Integument/Breast: has hair loss, no skin rashes, no skin lesions, no itching, has dry skin  Neurological: no numbness, no tingling, no weakness, no confusion, has headaches, no dizziness, no fainting, no tremors, no decrease in memory, no balance problems  Psychiatric: no anxiety, has depression, had insomnia  Hematologic/Lymphatic: no tendency for easy bleeding, no swollen lymph nodes, no tendency for easy bruising  Immunology: has seasonal allergies, no frequent infections, no frequent illnesses  Endocrine: no temperature intolerance    /76   Pulse (!) 105   Temp 98 °F (36.7 °C)   Resp 14   Ht 5' 5\" (1.651 m)   Wt 224 lb (101.6 kg)   SpO2 98%   BMI 37.28 kg/m²    Wt Readings from Last 3 Encounters:   07/18/22 224 lb (101.6 kg)   02/01/22 240 lb (108.9 kg)   11/01/21 222 lb (100.7 kg)     Body mass index is 37.28 kg/m².       OBJECTIVE:  Constitutional: no acute distress, well appearing and well nourished  Psychiatric: oriented to person, place and time, judgement and insight and normal, recent and remote memory and intact and mood and affect are normal  Skin: skin and subcutaneous tissue is normal without mass, normal turgor  Head and Face: examination of head and face revealed no abnormalities  Eyes: no lid or conjunctival swelling, erythema or discharge, pupils are normal, equal, round, reactive to light  Ears/Nose: external inspection of ears and nose revealed no abnormalities, hearing is grossly normal  Oropharynx/Mouth/Face: lips, tongue and gums are normal with no lesions, the voice quality was normal  Neck: neck is supple and symmetric, with midline trachea and no masses, thyroid is enlarged, palpable 3 cm right thyroid nodule  Lymphatics: normal cervical lymph nodes, normal supraclavicular nodes  Pulmonary: no increased work of breathing or signs of respiratory distress, lungs are clear to auscultation  Cardiovascular: normal heart rate and rhythm, normal S1 and S2, no murmurs and pedal pulses and 2+ bilaterally, No edema  Abdomen: abdomen is soft, non-tender with no masses  Musculoskeletal: normal gait and station and exam of the digits and nails are normal  Neurological: normal coordination and normal general cortical function    Visual inspection:  Deformity/amputation: absent  Skin lesions/pre-ulcerative calluses: absent  Edema: right- negative, left- negative    Sensory exam:  Monofilament sensation: normal  (minimum of 5 random plantar locations tested, avoiding callused areas - > 1 area with absence of sensation is + for neuropathy)    Plus at least one of the following:  Pulses: normal  Proprioception: Intact  Vibration (128 Hz): Intact    ASSESSMENT/PLAN:    1. Controlled type 2 diabetes mellitus without complication, without long-term current use of insulin ([address]  Increase Ozempic if indicated, patient will notify us  Hemoglobin A1c 6.7-6.1-6.5-6.8-5.9  Continue Metformin  mg 1 tablet twice a day  - Basic Metabolic Panel; Future  - Semaglutide,0.25 or 0.5MG/DOS, (OZEMPIC, 0.25 OR 0.5 MG/DOSE,) 2 MG/1.5ML SOPN; 0.5 mg weekly     2. Multinodular goiter  TSH 1.94  Thyroid sonogram 12/21/2021-right 3.1 cm and right 0.8 cm nodules. 1/17/2022 FNA cytology: Right 3.1 cm nodule-nondiagnostic. Recommend to repeat FNA of 3.1 cm cystic nodule. [place] when lived in [place]. Obtain report. TPO antibody, thyroglobulin antibody negative  - T3, Free; Future  - T4, Free; Future  - TSH without Reflex; Future  -Thyroid ultrasound    3.  Obesity  I discussed Gastric sleeve surgery  Diet and exercise  Weight watchers were not effective  Start phentermine    4. Hyperlipidemia, mixed    Triglycerides 155  HDL 39  Continue rosuvastatin 5 mg daily  -Lipid panel    5. Vitamin D deficiency  Continue multivitamin  -25 hydroxy vitamin D    6. Menopause  - Luteinizing Hormone; Future  - Estradiol; Future  - Follicle Stimulating Hormone; Future  - DHEA-Sulfate; Future  - Testosterone, free, total; Future    7. Alopecia  Improved  Monitor  DHEA-S pending  Testosterone 39, normal  Free testosterone 7.7, elevated  - DHEA-Sulfate; Future  - Testosterone, free, total; Future        Reviewed and/or ordered clinical lab results Yes  Reviewed and/or ordered radiology tests Yes  Reviewed and/or ordered other diagnostic tests No  Discussed test results with performing physician No  Independently reviewed image, tracing, or specimen No  Made a decision to obtain old records Yes  Reviewed and summarized old records Yes   TSH 2.8-2.27  HbA1C 6.8    Obtained history from other than patient No    Alan Nicole was counseled regarding symptoms of diabetes, thyroid nodule, alopecia, menopause diagnosis, course and complications of disease if inadequately treated, side effects of medications, diagnosis, treatment options, and prognosis, risks, benefits, complications, and alternatives of treatment, labs, imaging and other studies and treatment targets and goals, diabetes complication prevention, diabetes pathophysiology, basal and prandial insulin requirements, on Ozempic, side effects and benefits, weight management, thyroid nodule biopsy, risk of thyroid cancer, thyroid nodule follow-up, weight loss meds, side effects. She understands instructions and counseling. These diagnosis were discussed and reviewed with the patient including the advantages of drug therapy. She was counseled at this visit on the following: diabetes complication prevention and foot care.     Total time I spent for this encounter 40 min    Return in about 1 month (around 8/18/2022) for weight management.     Electronically signed by Yessica Walter MD on 7/18/2022 at 12:48 PM

## 2022-08-16 DIAGNOSIS — E11.9 TYPE 2 DIABETES MELLITUS WITHOUT COMPLICATIONS (HCC): ICD-10-CM

## 2022-08-16 RX ORDER — METFORMIN HYDROCHLORIDE 500 MG/1
TABLET, EXTENDED RELEASE ORAL
Qty: 180 TABLET | Refills: 3 | Status: SHIPPED | OUTPATIENT
Start: 2022-08-16

## 2022-08-16 RX ORDER — QUETIAPINE FUMARATE 50 MG/1
TABLET, FILM COATED ORAL
Qty: 90 TABLET | Refills: 1 | Status: SHIPPED | OUTPATIENT
Start: 2022-08-16

## 2022-08-16 NOTE — TELEPHONE ENCOUNTER
Medication:   Requested Prescriptions     Pending Prescriptions Disp Refills    QUEtiapine (SEROQUEL) 50 MG tablet [Pharmacy Med Name: QUETIAPINE FUMARATE 50 MG TAB] 90 tablet 1     Sig: TAKE 1 TABLET BY MOUTH EVERY DAY AT NIGHT    metFORMIN (GLUCOPHAGE-XR) 500 MG extended release tablet [Pharmacy Med Name: METFORMIN HCL  MG TABLET] 180 tablet 1     Sig: TAKE 1 TABLET BY MOUTH TWICE A DAY WITH MEALS       Last Filled: 4/28/2022 (metformin) 4/25/2022 (seroquel)     Patient Phone Number: [phone] (home)     Last appt: 7/13/2022   Next appt: Visit date not found    Last Labs DM:   Lab Results   Component Value Date/Time    LABA1C 5.9 07/13/2022 08:24 AM

## 2022-08-18 ENCOUNTER — OFFICE VISIT (OUTPATIENT)
Dept: ENDOCRINOLOGY | Age: 57
End: 2022-08-18
Payer: COMMERCIAL

## 2022-08-18 VITALS
SYSTOLIC BLOOD PRESSURE: 128 MMHG | HEART RATE: 74 BPM | OXYGEN SATURATION: 98 % | HEIGHT: 65 IN | RESPIRATION RATE: 14 BRPM | TEMPERATURE: 98 F | DIASTOLIC BLOOD PRESSURE: 74 MMHG | BODY MASS INDEX: 36.99 KG/M2 | WEIGHT: 222 LBS

## 2022-08-18 DIAGNOSIS — E66.01 CLASS 2 SEVERE OBESITY WITH SERIOUS COMORBIDITY AND BODY MASS INDEX (BMI) OF 36.0 TO 36.9 IN ADULT, UNSPECIFIED OBESITY TYPE (HCC): Primary | ICD-10-CM

## 2022-08-18 DIAGNOSIS — E66.01 CLASS 2 SEVERE OBESITY WITH SERIOUS COMORBIDITY AND BODY MASS INDEX (BMI) OF 39.0 TO 39.9 IN ADULT, UNSPECIFIED OBESITY TYPE (HCC): ICD-10-CM

## 2022-08-18 PROCEDURE — G8417 CALC BMI ABV UP PARAM F/U: HCPCS | Performed by: INTERNAL MEDICINE

## 2022-08-18 PROCEDURE — 99213 OFFICE O/P EST LOW 20 MIN: CPT | Performed by: INTERNAL MEDICINE

## 2022-08-18 PROCEDURE — 1036F TOBACCO NON-USER: CPT | Performed by: INTERNAL MEDICINE

## 2022-08-18 PROCEDURE — 95251 CONT GLUC MNTR ANALYSIS I&R: CPT | Performed by: INTERNAL MEDICINE

## 2022-08-18 PROCEDURE — 3017F COLORECTAL CA SCREEN DOC REV: CPT | Performed by: INTERNAL MEDICINE

## 2022-08-18 PROCEDURE — G8427 DOCREV CUR MEDS BY ELIG CLIN: HCPCS | Performed by: INTERNAL MEDICINE

## 2022-08-18 RX ORDER — PHENTERMINE HYDROCHLORIDE 37.5 MG/1
37.5 TABLET ORAL
Qty: 30 TABLET | Refills: 0 | OUTPATIENT
Start: 2022-08-18 | End: 2022-09-17

## 2022-08-18 RX ORDER — PHENTERMINE HYDROCHLORIDE 37.5 MG/1
37.5 TABLET ORAL
Qty: 30 TABLET | Refills: 0 | Status: SHIPPED | OUTPATIENT
Start: 2022-08-18 | End: 2022-09-15 | Stop reason: SDUPTHER

## 2022-08-18 NOTE — PROGRESS NOTES
Herb Jeffers is a 56 y.o. female who is being evaluated for weight management. Current symptoms/problems include none and show no change. Class 2 severe obesity with serious comorbidity and body mass index (BMI) of 36.0 to 36.9 in adult, unspecified obesity type ([facility].) [E66.01, Z68.36]    Eats healthy, walks  Tried weight watchers several times  Not successful with weight watchers  No side effects on phentermine  NO anxiety, palpitations, insomnia  Eats Mediterranean style foods.             Past Medical History:   Diagnosis Date    Depression     Hyperlipidemia, mixed 2018    Internal hemorrhoid     New onset type 2 diabetes mellitus ([facility].) 2018    Thyroid nodule       Patient Active Problem List   Diagnosis    Internal hemorrhoid    Adjustment disorder with depressed mood    Vitamin D deficiency    Hyperlipidemia, mixed    Thyroid nodule    Primary insomnia    Multinodular goiter    Class 2 obesity with body mass index (BMI) of 39.0 to 39.9 in adult    Menopause    Alopecia    Class 2 severe obesity with serious comorbidity and body mass index (BMI) of 39.0 to 39.9 in adult [facility])     Past Surgical History:   Procedure Laterality Date    ENDOMETRIAL ABLATION       Social History     Socioeconomic History    Marital status: Single     Spouse name: Not on file    Number of children: Not on file    Years of education: Not on file    Highest education level: Not on file   Occupational History    Not on file   Tobacco Use    Smoking status: Former     Packs/day: 1.00     Years: 2.00     Pack years: 2.00     Types: Cigarettes     Start date: 1980     Quit date: 2005     Years since quittin.0    Smokeless tobacco: Never   Substance and Sexual Activity    Alcohol use: Not Currently     Comment: social    Drug use: No    Sexual activity: Not on file   Other Topics Concern    Not on file   Social History Narrative    Not on file     Social Determinants of Health     Financial Resource Strain: Low Risk     Difficulty of Paying Living Expenses: Not hard at all   Food Insecurity: No Food Insecurity    Worried About Running Out of Food in the Last Year: Never true    Ran Out of Food in the Last Year: Never true   Transportation Needs: Not on file   Physical Activity: Not on file   Stress: Not on file   Social Connections: Not on file   Intimate Partner Violence: Not on file   Housing Stability: Not on file     Family History   Problem Relation Age of Onset    High Cholesterol Mother     Prostate Cancer Father         mets to aorta     Rheum Arthritis Sister      Current Outpatient Medications   Medication Sig Dispense Refill    phentermine (ADIPEX-P) 37.5 MG tablet Take 1 tablet by mouth every morning (before breakfast) for 30 days. 30 tablet 0    QUEtiapine (SEROQUEL) 50 MG tablet TAKE 1 TABLET BY MOUTH EVERY DAY AT NIGHT 90 tablet 1    metFORMIN (GLUCOPHAGE-XR) 500 MG extended release tablet TAKE 1 TABLET BY MOUTH TWICE A DAY WITH MEALS 180 tablet 3    vitamin C (ASCORBIC ACID) 500 MG tablet Take 500 mg by mouth in the morning.       Semaglutide,0.25 or 0.5MG/DOS, (OZEMPIC, 0.25 OR 0.5 MG/DOSE,) 2 MG/1.5ML SOPN Inject 0.5 mg weekly 2 pen 0    Cholecalciferol (VITAMIN D3) 125 MCG (5000 UT) TABS Take 5,000 Units by mouth      zinc gluconate 50 MG tablet Take 50 mg by mouth daily      OneTouch Delica Lancets 30G MISC Check blood sugars 2-3 times a day 200 each 5    venlafaxine (EFFEXOR XR) 75 MG extended release capsule 1 tablet po nightly 90 capsule 3    venlafaxine (EFFEXOR XR) 37.5 MG extended release capsule Take 1 capsule by mouth daily (with breakfast) 90 capsule 3    rosuvastatin (CRESTOR) 5 MG tablet Take 1 tablet by mouth nightly (Patient taking differently: Take 5 mg by mouth nightly Indications: Takes every other day.) 90 tablet 3    ONETOUCH ULTRA strip TEST DAILY 100 strip 5    Blood Glucose Monitoring Suppl ([employer] BLOOD GLUCOSE METER) w/Device KIT 1 each by Does not apply route daily NOTE PHARMACY: ONE TOUCH ULTRA METER 1 kit 0    blood glucose test strips (ACCU-CHEK JUSTO) strip 1 each by In Vitro route daily As needed. 100 each 3    Blood Glucose Monitoring Suppl (ACCU-CHEK JUSTO PLUS) w/Device KIT Tid 1 kit 0    aspirin EC 81 MG EC tablet Take 1 tablet by mouth daily 90 tablet 3    multivitamin (THERAGRAN) per tablet Take 1 tablet by mouth daily   (Patient not taking: No sig reported)       No current facility-administered medications for this visit.      No Known Allergies  Family Status   Relation Name Status    Mother  Alive    Father   at age 80        aortic carcinoma    Sister  Alive       Lab Review:    Lab Results   Component Value Date/Time    WBC 5.5 2022 08:24 AM    HGB 12.4 2022 08:24 AM    HCT 37.7 2022 08:24 AM    MCV 89.3 2022 08:24 AM     2022 08:24 AM     Lab Results   Component Value Date/Time     2022 08:26 AM    K 4.0 2022 08:26 AM     2022 08:26 AM    CO2 25 2022 08:26 AM    BUN 11 2022 08:26 AM    CREATININE 0.7 2022 08:26 AM    GLUCOSE 113 2022 08:26 AM    CALCIUM 9.2 2022 08:26 AM    PROT 6.3 2022 08:24 AM    PROT 7.2 2011 09:26 AM    LABALBU 4.6 2022 08:24 AM    BILITOT 0.3 2022 08:24 AM    ALKPHOS 93 2022 08:24 AM    AST 15 2022 08:24 AM    ALT 15 2022 08:24 AM    LABGLOM >60 2022 08:26 AM    GFRAA >60 2022 08:26 AM    GFRAA >60 2011 09:26 AM    AGRATIO 2.7 2022 08:24 AM    GLOB 2.5 2022 08:13 AM     Lab Results   Component Value Date/Time    TSH 1.94 2022 08:24 AM    FT3 2.9 2022 08:26 AM     Lab Results   Component Value Date/Time    LABA1C 5.9 2022 08:24 AM     Lab Results   Component Value Date/Time    .6 2022 08:24 AM     Lab Results   Component Value Date/Time    CHOL 195 2022 08:24 AM     Lab Results   Component Value Date/Time    TRIG 155 2022 08:24 AM Lab Results   Component Value Date/Time    HDL 39 07/13/2022 08:24 AM    HDL 52 09/06/2011 09:26 AM     Lab Results   Component Value Date/Time    LDLCALC 125 07/13/2022 08:24 AM     Lab Results   Component Value Date/Time    LABVLDL 31 07/13/2022 08:24 AM     No results found for: CHOLHDLRATIO  No results found for: LABMICR, [id number]  Lab Results   Component Value Date/Time    VITD25 26 09/06/2011 09:26 AM        Review of Systems:  Constitutional: has fatigue, no fever, no recent weight gain, no recent weight loss, no changes in appetite  Eyes: no eye pain, no change in vision, no eye redness, no eye irritation, no double vision  Ears, nose, throat: no nasal congestion, no sore throat, no earache, no decrease in hearing, no hoarseness, no dry mouth, no sinus problems, no difficulty swallowing, no neck lumps, no dental problems, no mouth sores, no ringing in ears  Pulmonary: no shortness of breath, no wheezing, no dyspnea on exertion, no cough  Cardiovascular: no chest pain, no lower extremity edema, no orthopnea, no intermittent leg claudication, no palpitations  Gastrointestinal: no abdominal pain, no nausea, no vomiting, no diarrhea, no constipation, no dysphagia, no heartburn, no bloating  Genitourinary: no dysuria, no urinary incontinence, no urinary hesitancy, has urinary frequency, no feelings of urinary urgency, no nocturia  Musculoskeletal: no joint swelling, no joint stiffness, no joint pain, no muscle cramps, no muscle pain, no bone pain  Integument/Breast: has hair loss, no skin rashes, no skin lesions, no itching, has dry skin  Neurological: no numbness, no tingling, no weakness, no confusion, has headaches, no dizziness, no fainting, no tremors, no decrease in memory, no balance problems  Psychiatric: no anxiety, has depression, had insomnia  Hematologic/Lymphatic: no tendency for easy bleeding, no swollen lymph nodes, no tendency for easy bruising  Immunology: has seasonal allergies, no frequent infections, no frequent illnesses  Endocrine: no temperature intolerance    /74   Pulse 74   Temp 98 °F (36.7 °C)   Resp 14   Ht 5' 5\" (1.651 m)   Wt 222 lb (100.7 kg)   SpO2 98%   BMI 36.94 kg/m²    Wt Readings from Last 3 Encounters:   08/18/22 222 lb (100.7 kg)   07/18/22 224 lb (101.6 kg)   02/01/22 240 lb (108.9 kg)     Body mass index is 36.94 kg/m². OBJECTIVE:  Constitutional: no acute distress, well appearing and well nourished  Psychiatric: oriented to person, place and time, judgement and insight and normal, recent and remote memory and intact and mood and affect are normal  Skin: skin and subcutaneous tissue is normal without mass, normal turgor  Head and Face: examination of head and face revealed no abnormalities  Eyes: no lid or conjunctival swelling, erythema or discharge, pupils are normal, equal, round, reactive to light  Ears/Nose: external inspection of ears and nose revealed no abnormalities, hearing is grossly normal  Oropharynx/Mouth/Face: lips, tongue and gums are normal with no lesions, the voice quality was normal  Neck: neck is supple and symmetric, with midline trachea and no masses, thyroid is enlarged, palpable 3 cm right thyroid nodule  Lymphatics: normal cervical lymph nodes, normal supraclavicular nodes  Pulmonary: no increased work of breathing or signs of respiratory distress, lungs are clear to auscultation  Cardiovascular: normal heart rate and rhythm, normal S1 and S2, no murmurs and pedal pulses and 2+ bilaterally, No edema  Abdomen: abdomen is soft, non-tender with no masses  Musculoskeletal: normal gait and station and exam of the digits and nails are normal  Neurological: normal coordination and normal general cortical function      ASSESSMENT/PLAN:    1. Obesity  7/18/2022  Continue diet and exercise  Diet and exercise  Weight watchers were not effective  Started phentermine 7/18/2022   Starting weight 224 pounds. Tolerating it well.   No palpitations, anxiety, insomnia. Continue phentermine. Counseled on Qsymia. Reviewed and/or ordered clinical lab results Yes  Reviewed and/or ordered radiology tests Yes  Reviewed and/or ordered other diagnostic tests No  Discussed test results with performing physician No  Independently reviewed image, tracing, or specimen No  Made a decision to obtain old records Yes  Reviewed and summarized old records Yes   TSH 2.8-2.27  HbA1C 6.8    Obtained history from other than patient No    Nina Roberson was counseled regarding symptoms of weight management diagnosis, course and complications of disease if inadequately treated, side effects of medications. She understands instructions and counseling. These diagnosis were discussed and reviewed with the patient including the advantages of drug therapy. She was counseled at this visit on the following: diabetes complication prevention and foot care. Total time I spent for this encounter 20 min    Return in about 1 month (around 9/18/2022) for weight management.     Electronically signed by Tram Ye MD on 8/18/2022 at 11:52 PM

## 2022-08-30 DIAGNOSIS — E11.9 CONTROLLED TYPE 2 DIABETES MELLITUS WITHOUT COMPLICATION, WITHOUT LONG-TERM CURRENT USE OF INSULIN (HCC): ICD-10-CM

## 2022-08-30 RX ORDER — SEMAGLUTIDE 1.34 MG/ML
INJECTION, SOLUTION SUBCUTANEOUS
Qty: 1 PEN | Refills: 0 | Status: SHIPPED | OUTPATIENT
Start: 2022-08-30 | End: 2022-10-17

## 2022-09-15 ENCOUNTER — OFFICE VISIT (OUTPATIENT)
Dept: ENDOCRINOLOGY | Age: 57
End: 2022-09-15
Payer: COMMERCIAL

## 2022-09-15 VITALS
RESPIRATION RATE: 14 BRPM | WEIGHT: 221 LBS | SYSTOLIC BLOOD PRESSURE: 118 MMHG | OXYGEN SATURATION: 98 % | HEART RATE: 110 BPM | HEIGHT: 65 IN | BODY MASS INDEX: 36.82 KG/M2 | DIASTOLIC BLOOD PRESSURE: 78 MMHG | TEMPERATURE: 98 F

## 2022-09-15 DIAGNOSIS — E66.01 CLASS 2 SEVERE OBESITY WITH SERIOUS COMORBIDITY AND BODY MASS INDEX (BMI) OF 36.0 TO 36.9 IN ADULT, UNSPECIFIED OBESITY TYPE (HCC): Primary | ICD-10-CM

## 2022-09-15 PROBLEM — E66.812 CLASS 2 SEVERE OBESITY WITH SERIOUS COMORBIDITY AND BODY MASS INDEX (BMI) OF 36.0 TO 36.9 IN ADULT: Status: ACTIVE | Noted: 2022-09-15

## 2022-09-15 PROCEDURE — 1036F TOBACCO NON-USER: CPT | Performed by: INTERNAL MEDICINE

## 2022-09-15 PROCEDURE — G8417 CALC BMI ABV UP PARAM F/U: HCPCS | Performed by: INTERNAL MEDICINE

## 2022-09-15 PROCEDURE — 3017F COLORECTAL CA SCREEN DOC REV: CPT | Performed by: INTERNAL MEDICINE

## 2022-09-15 PROCEDURE — 99213 OFFICE O/P EST LOW 20 MIN: CPT | Performed by: INTERNAL MEDICINE

## 2022-09-15 PROCEDURE — G8427 DOCREV CUR MEDS BY ELIG CLIN: HCPCS | Performed by: INTERNAL MEDICINE

## 2022-09-15 RX ORDER — PHENTERMINE HYDROCHLORIDE 37.5 MG/1
37.5 TABLET ORAL
Qty: 30 TABLET | Refills: 0 | Status: SHIPPED | OUTPATIENT
Start: 2022-09-15 | End: 2022-10-15

## 2022-09-15 NOTE — PROGRESS NOTES
ONE TOUCH ULTRA METER 1 kit 0    blood glucose test strips (ACCU-CHEK JUSTO) strip 1 each by In Vitro route daily As needed. 100 each 3    Blood Glucose Monitoring Suppl (ACCU-CHEK JUSTO PLUS) w/Device KIT Tid 1 kit 0    phentermine (ADIPEX-P) 37.5 MG tablet Take 1 tablet by mouth every morning (before breakfast) for 30 days. 30 tablet 0    aspirin EC 81 MG EC tablet Take 1 tablet by mouth daily 90 tablet 3    multivitamin (THERAGRAN) per tablet Take 1 tablet by mouth daily   (Patient not taking: No sig reported)       No current facility-administered medications for this visit.      No Known Allergies  Family Status   Relation Name Status    Mother  Alive    Father   at age 80        aortic carcinoma    Sister  Alive       Lab Review:    Lab Results   Component Value Date/Time    WBC 5.5 2022 08:24 AM    HGB 12.4 2022 08:24 AM    HCT 37.7 2022 08:24 AM    MCV 89.3 2022 08:24 AM     2022 08:24 AM     Lab Results   Component Value Date/Time     2022 08:26 AM    K 4.0 2022 08:26 AM     2022 08:26 AM    CO2 25 2022 08:26 AM    BUN 11 2022 08:26 AM    CREATININE 0.7 2022 08:26 AM    GLUCOSE 113 2022 08:26 AM    CALCIUM 9.2 2022 08:26 AM    PROT 6.3 2022 08:24 AM    PROT 7.2 2011 09:26 AM    LABALBU 4.6 2022 08:24 AM    BILITOT 0.3 2022 08:24 AM    ALKPHOS 93 2022 08:24 AM    AST 15 2022 08:24 AM    ALT 15 2022 08:24 AM    LABGLOM >60 2022 08:26 AM    GFRAA >60 2022 08:26 AM    GFRAA >60 2011 09:26 AM    AGRATIO 2.7 2022 08:24 AM    GLOB 2.5 2022 08:13 AM     Lab Results   Component Value Date/Time    TSH 1.94 2022 08:24 AM    FT3 2.9 2022 08:26 AM     Lab Results   Component Value Date/Time    LABA1C 5.9 2022 08:24 AM     Lab Results   Component Value Date/Time    .6 2022 08:24 AM     Lab Results   Component Value Date/Time    CHOL 195 07/13/2022 08:24 AM     Lab Results   Component Value Date/Time    TRIG 155 07/13/2022 08:24 AM     Lab Results   Component Value Date/Time    HDL 39 07/13/2022 08:24 AM    HDL 52 09/06/2011 09:26 AM     Lab Results   Component Value Date/Time    LDLCALC 125 07/13/2022 08:24 AM     Lab Results   Component Value Date/Time    LABVLDL 31 07/13/2022 08:24 AM     No results found for: CHOLHDLRATIO  No results found for: LABMICR, [id number]  Lab Results   Component Value Date/Time    VITD25 26 09/06/2011 09:26 AM        Review of Systems:  Constitutional: has fatigue, no fever, no recent weight gain, no recent weight loss, no changes in appetite  Eyes: no eye pain, no change in vision, no eye redness, no eye irritation, no double vision  Ears, nose, throat: no nasal congestion, no sore throat, no earache, no decrease in hearing, no hoarseness, no dry mouth, no sinus problems, no difficulty swallowing, no neck lumps, no dental problems, no mouth sores, no ringing in ears  Pulmonary: no shortness of breath, no wheezing, no dyspnea on exertion, no cough  Cardiovascular: no chest pain, no lower extremity edema, no orthopnea, no intermittent leg claudication, no palpitations  Gastrointestinal: no abdominal pain, no nausea, no vomiting, no diarrhea, no constipation, no dysphagia, no heartburn, no bloating  Genitourinary: no dysuria, no urinary incontinence, no urinary hesitancy, has urinary frequency, no feelings of urinary urgency, no nocturia  Musculoskeletal: no joint swelling, no joint stiffness, no joint pain, no muscle cramps, no muscle pain, no bone pain  Integument/Breast: has hair loss, no skin rashes, no skin lesions, no itching, has dry skin  Neurological: no numbness, no tingling, no weakness, no confusion, has headaches, no dizziness, no fainting, no tremors, no decrease in memory, no balance problems  Psychiatric: no anxiety, has depression, had insomnia  Hematologic/Lymphatic: no tendency for easy bleeding, no swollen lymph nodes, no tendency for easy bruising  Immunology: has seasonal allergies, no frequent infections, no frequent illnesses  Endocrine: no temperature intolerance    /78   Pulse (!) 110   Temp 98 °F (36.7 °C)   Resp 14   Ht 5' 5\" (1.651 m)   Wt 221 lb (100.2 kg)   SpO2 98%   BMI 36.78 kg/m²    Wt Readings from Last 3 Encounters:   09/15/22 221 lb (100.2 kg)   08/18/22 222 lb (100.7 kg)   07/18/22 224 lb (101.6 kg)     Body mass index is 36.78 kg/m². OBJECTIVE:  Constitutional: no acute distress, well appearing and well nourished  Psychiatric: oriented to person, place and time, judgement and insight and normal, recent and remote memory and intact and mood and affect are normal  Skin: skin and subcutaneous tissue is normal without mass, normal turgor  Head and Face: examination of head and face revealed no abnormalities  Eyes: no lid or conjunctival swelling, erythema or discharge, pupils are normal, equal, round, reactive to light  Ears/Nose: external inspection of ears and nose revealed no abnormalities, hearing is grossly normal  Oropharynx/Mouth/Face: lips, tongue and gums are normal with no lesions, the voice quality was normal  Neck: neck is supple and symmetric, with midline trachea and no masses, thyroid is enlarged, palpable 3 cm right thyroid nodule  Lymphatics: normal cervical lymph nodes, normal supraclavicular nodes  Pulmonary: no increased work of breathing or signs of respiratory distress, lungs are clear to auscultation  Cardiovascular: normal heart rate and rhythm, normal S1 and S2, no murmurs and pedal pulses and 2+ bilaterally, No edema  Abdomen: abdomen is soft, non-tender with no masses  Musculoskeletal: normal gait and station and exam of the digits and nails are normal  Neurological: normal coordination and normal general cortical function      ASSESSMENT/PLAN:    1.  Obesity  7/18/2022  Continue diet and exercise  Diet and exercise  Weight watchers were not effective  Started phentermine 7/18/2022   Starting weight 224 pounds. Tolerating it well. No palpitations, anxiety, insomnia. Continue phentermine. Counseled on Qsymia. Reviewed and/or ordered clinical lab results Yes  Reviewed and/or ordered radiology tests Yes  Reviewed and/or ordered other diagnostic tests No  Discussed test results with performing physician No  Independently reviewed image, tracing, or specimen No  Made a decision to obtain old records Yes  Reviewed and summarized old records Yes   TSH 2.8-2.27  HbA1C 6.8    Obtained history from other than patient No    Nina Roberson was counseled regarding symptoms of weight management diagnosis, course and complications of disease if inadequately treated, side effects of medications. She understands instructions and counseling. These diagnosis were discussed and reviewed with the patient including the advantages of drug therapy. She was counseled at this visit on the following: diabetes complication prevention and foot care. Total time I spent for this encounter 20 min    Return in about 1 month (around 10/15/2022) for fatigue.     Electronically signed by Tram Ye MD on 9/15/2022 at 5:06 PM

## 2022-10-17 DIAGNOSIS — E11.9 CONTROLLED TYPE 2 DIABETES MELLITUS WITHOUT COMPLICATION, WITHOUT LONG-TERM CURRENT USE OF INSULIN (HCC): ICD-10-CM

## 2022-10-17 RX ORDER — SEMAGLUTIDE 1.34 MG/ML
INJECTION, SOLUTION SUBCUTANEOUS
Qty: 1.5 ML | Refills: 0 | Status: SHIPPED | OUTPATIENT
Start: 2022-10-17 | End: 2022-10-18 | Stop reason: ALTCHOICE

## 2022-10-18 ENCOUNTER — OFFICE VISIT (OUTPATIENT)
Dept: ENDOCRINOLOGY | Age: 57
End: 2022-10-18
Payer: COMMERCIAL

## 2022-10-18 ENCOUNTER — TELEPHONE (OUTPATIENT)
Dept: ENDOCRINOLOGY | Age: 57
End: 2022-10-18

## 2022-10-18 VITALS
WEIGHT: 226 LBS | SYSTOLIC BLOOD PRESSURE: 119 MMHG | OXYGEN SATURATION: 98 % | HEART RATE: 97 BPM | DIASTOLIC BLOOD PRESSURE: 90 MMHG | BODY MASS INDEX: 37.61 KG/M2

## 2022-10-18 DIAGNOSIS — E78.2 HYPERLIPIDEMIA, MIXED: ICD-10-CM

## 2022-10-18 DIAGNOSIS — E04.1 THYROID NODULE: ICD-10-CM

## 2022-10-18 DIAGNOSIS — E11.9 CONTROLLED TYPE 2 DIABETES MELLITUS WITHOUT COMPLICATION, WITHOUT LONG-TERM CURRENT USE OF INSULIN (HCC): Primary | ICD-10-CM

## 2022-10-18 DIAGNOSIS — L65.9 ALOPECIA: ICD-10-CM

## 2022-10-18 DIAGNOSIS — E04.2 MULTINODULAR GOITER: ICD-10-CM

## 2022-10-18 DIAGNOSIS — E66.9 CLASS 2 OBESITY WITH BODY MASS INDEX (BMI) OF 37.0 TO 37.9 IN ADULT, UNSPECIFIED OBESITY TYPE, UNSPECIFIED WHETHER SERIOUS COMORBIDITY PRESENT: ICD-10-CM

## 2022-10-18 DIAGNOSIS — E55.9 VITAMIN D DEFICIENCY: ICD-10-CM

## 2022-10-18 PROBLEM — E66.812 CLASS 2 OBESITY WITH BODY MASS INDEX (BMI) OF 37.0 TO 37.9 IN ADULT: Status: ACTIVE | Noted: 2022-10-18

## 2022-10-18 PROCEDURE — 3044F HG A1C LEVEL LT 7.0%: CPT | Performed by: INTERNAL MEDICINE

## 2022-10-18 PROCEDURE — 99214 OFFICE O/P EST MOD 30 MIN: CPT | Performed by: INTERNAL MEDICINE

## 2022-10-18 PROCEDURE — G8484 FLU IMMUNIZE NO ADMIN: HCPCS | Performed by: INTERNAL MEDICINE

## 2022-10-18 PROCEDURE — 3017F COLORECTAL CA SCREEN DOC REV: CPT | Performed by: INTERNAL MEDICINE

## 2022-10-18 PROCEDURE — 1036F TOBACCO NON-USER: CPT | Performed by: INTERNAL MEDICINE

## 2022-10-18 PROCEDURE — G8417 CALC BMI ABV UP PARAM F/U: HCPCS | Performed by: INTERNAL MEDICINE

## 2022-10-18 PROCEDURE — G8427 DOCREV CUR MEDS BY ELIG CLIN: HCPCS | Performed by: INTERNAL MEDICINE

## 2022-10-18 PROCEDURE — 2022F DILAT RTA XM EVC RTNOPTHY: CPT | Performed by: INTERNAL MEDICINE

## 2022-10-18 NOTE — PROGRESS NOTES
Jan Wang is a 56 y.o. female who is being evaluated for weight management. Current symptoms/problems include none and show no change. Class 2 obesity with body mass index (BMI) of 37.0 to 37.9 in adult, unspecified obesity type, unspecified whether serious comorbidity present [E66.9, Z68.37]    Eats healthy, walks  Tried weight watchers several times  Not successful with weight watchers  No side effects on phentermine  No anxiety, palpitations, insomnia  Eats Mediterranean style foods.             Past Medical History:   Diagnosis Date    Depression     Hyperlipidemia, mixed 2018    Internal hemorrhoid     New onset type 2 diabetes mellitus ([facility] [address] 2018    Thyroid nodule       Patient Active Problem List   Diagnosis    Internal hemorrhoid    Adjustment disorder with depressed mood    Vitamin D deficiency    Hyperlipidemia, mixed    Thyroid nodule    Primary insomnia    Multinodular goiter    Class 2 obesity with body mass index (BMI) of 39.0 to 39.9 in adult    Menopause    Alopecia    Class 2 severe obesity with serious comorbidity and body mass index (BMI) of 39.0 to 39.9 in adult [facility])    Class 2 severe obesity with serious comorbidity and body mass index (BMI) of 36.0 to 36.9 in adult ([facility])    Class 2 obesity with body mass index (BMI) of 37.0 to 37.9 in adult     Past Surgical History:   Procedure Laterality Date    ENDOMETRIAL ABLATION       Social History     Socioeconomic History    Marital status: Single     Spouse name: Not on file    Number of children: Not on file    Years of education: Not on file    Highest education level: Not on file   Occupational History    Not on file   Tobacco Use    Smoking status: Former     Packs/day: 1.00     Years: 2.00     Pack years: 2.00     Types: Cigarettes     Start date: 1980     Quit date: 2005     Years since quittin.1    Smokeless tobacco: Never   Substance and Sexual Activity    Alcohol use: Not Currently     Comment: social Drug use: No    Sexual activity: Not on file   Other Topics Concern    Not on file   Social History Narrative    Not on file     Social Determinants of Health     Financial Resource Strain: Low Risk     Difficulty of Paying Living Expenses: Not hard at all   Food Insecurity: No Food Insecurity    Worried About Running Out of Food in the Last Year: Never true    Ran Out of Food in the Last Year: Never true   Transportation Needs: Not on file   Physical Activity: Not on file   Stress: Not on file   Social Connections: Not on file   Intimate Partner Violence: Not on file   Housing Stability: Not on file     Family History   Problem Relation Age of Onset    High Cholesterol Mother     Prostate Cancer Father         mets to aorta     Rheum Arthritis Sister      Current Outpatient Medications   Medication Sig Dispense Refill    Semaglutide, 1 MG/DOSE, 4 MG/3ML SOPN 1 mg weekly 3 mL 2    QUEtiapine (SEROQUEL) 50 MG tablet TAKE 1 TABLET BY MOUTH EVERY DAY AT NIGHT 90 tablet 1    metFORMIN (GLUCOPHAGE-XR) 500 MG extended release tablet TAKE 1 TABLET BY MOUTH TWICE A DAY WITH MEALS 180 tablet 3    vitamin C (ASCORBIC ACID) 500 MG tablet Take 500 mg by mouth in the morning.       Cholecalciferol (VITAMIN D3) 125 MCG (5000 UT) TABS Take 5,000 Units by mouth      zinc gluconate 50 MG tablet Take 50 mg by mouth daily      OneTouch Delica Lancets 08I MISC Check blood sugars 2-3 times a day 200 each 5    venlafaxine (EFFEXOR XR) 75 MG extended release capsule 1 tablet po nightly 90 capsule 3    venlafaxine (EFFEXOR XR) 37.5 MG extended release capsule Take 1 capsule by mouth daily (with breakfast) 90 capsule 3    rosuvastatin (CRESTOR) 5 MG tablet Take 1 tablet by mouth nightly (Patient taking differently: Take 5 mg by mouth nightly Indications: Takes every other day.) 90 tablet 3    ONETOUCH ULTRA strip TEST DAILY 100 strip 5    Blood Glucose Monitoring Suppl ([employer] BLOOD GLUCOSE METER) w/Device KIT 1 each by Does not apply route daily NOTE PHARMACY: ONE TOUCH ULTRA METER 1 kit 0    blood glucose test strips (ACCU-CHEK JUSTO) strip 1 each by In Vitro route daily As needed. 100 each 3    Blood Glucose Monitoring Suppl (ACCU-CHEK JUSTO PLUS) w/Device KIT Tid 1 kit 0    multivitamin (THERAGRAN) per tablet Take 1 tablet by mouth daily      aspirin EC 81 MG EC tablet Take 1 tablet by mouth daily (Patient not taking: Reported on 10/18/2022) 90 tablet 3     No current facility-administered medications for this visit.      No Known Allergies  Family Status   Relation Name Status    Mother  Alive    Father   at age 80        aortic carcinoma    Sister  Alive       Lab Review:    Lab Results   Component Value Date/Time    WBC 5.5 2022 08:24 AM    HGB 12.4 2022 08:24 AM    HCT 37.7 2022 08:24 AM    MCV 89.3 2022 08:24 AM     2022 08:24 AM     Lab Results   Component Value Date/Time     2022 08:26 AM    K 4.0 2022 08:26 AM     2022 08:26 AM    CO2 25 2022 08:26 AM    BUN 11 2022 08:26 AM    CREATININE 0.7 2022 08:26 AM    GLUCOSE 113 2022 08:26 AM    CALCIUM 9.2 2022 08:26 AM    PROT 6.3 2022 08:24 AM    PROT 7.2 2011 09:26 AM    LABALBU 4.6 2022 08:24 AM    BILITOT 0.3 2022 08:24 AM    ALKPHOS 93 2022 08:24 AM    AST 15 2022 08:24 AM    ALT 15 2022 08:24 AM    LABGLOM >60 2022 08:26 AM    GFRAA >60 2022 08:26 AM    GFRAA >60 2011 09:26 AM    AGRATIO 2.7 2022 08:24 AM    GLOB 2.5 2022 08:13 AM     Lab Results   Component Value Date/Time    TSH 1.94 2022 08:24 AM    FT3 2.9 2022 08:26 AM     Lab Results   Component Value Date/Time    LABA1C 5.9 2022 08:24 AM     Lab Results   Component Value Date/Time    .6 2022 08:24 AM     Lab Results   Component Value Date/Time    CHOL 195 2022 08:24 AM     Lab Results   Component Value Date/Time    TRIG 155 07/13/2022 08:24 AM     Lab Results   Component Value Date/Time    HDL 39 07/13/2022 08:24 AM    HDL 52 09/06/2011 09:26 AM     Lab Results   Component Value Date/Time    LDLCALC 125 07/13/2022 08:24 AM     Lab Results   Component Value Date/Time    LABVLDL 31 07/13/2022 08:24 AM     No results found for: CHOLHDLRATIO  No results found for: LABMICR, [id number]  Lab Results   Component Value Date/Time    VITD25 26 09/06/2011 09:26 AM        Review of Systems:  Constitutional: has fatigue, no fever, no recent weight gain, no recent weight loss, no changes in appetite  Eyes: no eye pain, no change in vision, no eye redness, no eye irritation, no double vision  Ears, nose, throat: no nasal congestion, no sore throat, no earache, no decrease in hearing, no hoarseness, no dry mouth, no sinus problems, no difficulty swallowing, no neck lumps, no dental problems, no mouth sores, no ringing in ears  Pulmonary: no shortness of breath, no wheezing, no dyspnea on exertion, no cough  Cardiovascular: no chest pain, no lower extremity edema, no orthopnea, no intermittent leg claudication, no palpitations  Gastrointestinal: no abdominal pain, no nausea, no vomiting, no diarrhea, no constipation, no dysphagia, no heartburn, no bloating  Genitourinary: no dysuria, no urinary incontinence, no urinary hesitancy, has urinary frequency, no feelings of urinary urgency, no nocturia  Musculoskeletal: no joint swelling, no joint stiffness, no joint pain, no muscle cramps, no muscle pain, no bone pain  Integument/Breast: has hair loss, no skin rashes, no skin lesions, no itching, has dry skin  Neurological: no numbness, no tingling, no weakness, no confusion, has headaches, no dizziness, no fainting, no tremors, no decrease in memory, no balance problems  Psychiatric: no anxiety, has depression, had insomnia  Hematologic/Lymphatic: no tendency for easy bleeding, no swollen lymph nodes, no tendency for easy bruising  Immunology: has seasonal allergies, no frequent infections, no frequent illnesses  Endocrine: no temperature intolerance    BP (!) 119/90   Pulse 97   Wt 226 lb (102.5 kg)   SpO2 98%   BMI 37.61 kg/m²    Wt Readings from Last 3 Encounters:   10/18/22 226 lb (102.5 kg)   09/15/22 221 lb (100.2 kg)   08/18/22 222 lb (100.7 kg)     Body mass index is 37.61 kg/m². OBJECTIVE:  Constitutional: no acute distress, well appearing and well nourished  Psychiatric: oriented to person, place and time, judgement and insight and normal, recent and remote memory and intact and mood and affect are normal  Skin: skin and subcutaneous tissue is normal without mass, normal turgor  Head and Face: examination of head and face revealed no abnormalities  Eyes: no lid or conjunctival swelling, erythema or discharge, pupils are normal, equal, round, reactive to light  Ears/Nose: external inspection of ears and nose revealed no abnormalities, hearing is grossly normal  Oropharynx/Mouth/Face: lips, tongue and gums are normal with no lesions, the voice quality was normal  Neck: neck is supple and symmetric, with midline trachea and no masses, thyroid is enlarged, palpable 3 cm right thyroid nodule  Lymphatics: normal cervical lymph nodes, normal supraclavicular nodes  Pulmonary: no increased work of breathing or signs of respiratory distress, lungs are clear to auscultation  Cardiovascular: normal heart rate and rhythm, normal S1 and S2, no murmurs and pedal pulses and 2+ bilaterally, No edema  Abdomen: abdomen is soft, non-tender with no masses  Musculoskeletal: normal gait and station and exam of the digits and nails are normal  Neurological: normal coordination and normal general cortical function      ASSESSMENT/PLAN:    1. Obesity  Call for results  7/18/2022  Continue diet and exercise  Diet and exercise  Weight watchers were not effective  Started phentermine 7/18/2022   Starting weight 224 pounds. Ending weight 226 lbs today.   Not effective. Tolerating it well. No palpitations, anxiety, insomnia. Counseled on Qsymia. Reviewed and/or ordered clinical lab results Yes  Reviewed and/or ordered radiology tests Yes  Reviewed and/or ordered other diagnostic tests No  Discussed test results with performing physician No  Independently reviewed image, tracing, or specimen No  Made a decision to obtain old records Yes  Reviewed and summarized old records Yes   TSH 2.8-2.27  HbA1C 6.8    Obtained history from other than patient No    Mamadou Benjamin was counseled regarding symptoms of weight management diagnosis, course and complications of disease if inadequately treated, side effects of medications. She understands instructions and counseling. These diagnosis were discussed and reviewed with the patient including the advantages of drug therapy. She was counseled at this visit on the following: diabetes complication prevention and foot care. Total time I spent for this encounter 20 min    Return in about 3 months (around 1/18/2023) for diabetes.     Electronically signed by Thuy Cuenca MD on 10/18/2022 at 1:21 PM

## 2022-10-19 LAB
A/G RATIO: 1.9 (ref 1.2–2.2)
ALBUMIN SERPL-MCNC: 4 G/DL (ref 3.8–4.9)
ALP BLD-CCNC: 110 IU/L (ref 44–121)
ALT SERPL-CCNC: 13 IU/L (ref 0–32)
AST SERPL-CCNC: 16 IU/L (ref 0–40)
BILIRUB SERPL-MCNC: 0.3 MG/DL (ref 0–1.2)
BUN / CREAT RATIO: 8 (ref 9–23)
BUN BLDV-MCNC: 7 MG/DL (ref 6–24)
CALCIUM SERPL-MCNC: 9.1 MG/DL (ref 8.7–10.2)
CHLORIDE BLD-SCNC: 100 MMOL/L (ref 96–106)
CHOLESTEROL, TOTAL: 217 MG/DL (ref 100–199)
CO2: 25 MMOL/L (ref 20–29)
COMMENT: ABNORMAL
CREAT SERPL-MCNC: 0.84 MG/DL (ref 0.57–1)
DHEAS (DHEA SULFATE): 274 UG/DL (ref 29.4–220.5)
ESTIMATED GLOMERULAR FILTRATION RATE CREATININE EQUATION: 82 ML/MIN/1.73
GLOBULIN: 2.1 G/DL (ref 1.5–4.5)
GLUCOSE BLD-MCNC: 114 MG/DL (ref 70–99)
HBA1C MFR BLD: 6.8 % (ref 4.8–5.6)
HDLC SERPL-MCNC: 49 MG/DL
LDL CHOLESTEROL CALCULATED: 141 MG/DL (ref 0–99)
POTASSIUM SERPL-SCNC: 5.1 MMOL/L (ref 3.5–5.2)
SODIUM BLD-SCNC: 138 MMOL/L (ref 134–144)
T3 FREE: 2.6 PG/ML (ref 2–4.4)
T4 FREE: 1 NG/DL (ref 0.82–1.77)
TESTOSTERONE FREE-MALE: 2.1 PG/ML (ref 0–4.2)
TESTOSTERONE TOTAL-MALE: 36 NG/DL (ref 4–50)
TOTAL PROTEIN: 6.1 G/DL (ref 6–8.5)
TRIGL SERPL-MCNC: 153 MG/DL (ref 0–149)
TSH SERPL DL<=0.05 MIU/L-ACNC: 1.83 UIU/ML (ref 0.45–4.5)
VITAMIN D 25-HYDROXY: 55.5 NG/ML (ref 30–100)
VLDLC SERPL CALC-MCNC: 27 MG/DL (ref 5–40)

## 2022-10-19 NOTE — PROGRESS NOTES
Ana Pearson is a 56 y.o. female who is being evaluated for Type 2 diabetes mellitus. Current symptoms/problems include none and show no change. Controlled type 2 diabetes mellitus without complication, without long-term current use of insulin [facility] [E11.9]    Diagnosed with Type 2 diabetes mellitus in 2014. Comorbid conditions:  hyperlipidemia and Neuropathy    Current diabetic medications include: Metformin  mg bid    Intolerance to diabetes medications: No     Weight trend: stable  Prior visit with dietician: yes  Current diet: on average, 3 meals per day  Current exercise: walking     Current monitoring regimen: home blood tests - 1 times daily  Has brought blood glucose log/meter:  No  Home blood sugar records: fasting range: 150 and postprandial range: 170-180   Any episodes of hypoglycemia? No  Hypoglycemia frequency and time(s):    Does patient have Glucagon emergency kit? No  Does patient have rapid acting carbohydrate? No  Does patient wear a medic alert bracelet or necklace? No    2. Multinodular goiter  This started in 2016. Patient was diagnosed with thyroid nodule. The problem has been unchanged. Previous thyroid studies include: TSH and free thyroxine. Patient started medication in N/A. Currently patient is on: N/A. Misses  N/A doses a month. Current complaints: fatigue, brain fog, difficulty loosing weight  Right 3.1 cm and 0.8 cm thyroid nodules  FNA of right 3.1 cm nodule nondiagnostic  Had F[place] in [place]  Report not available    History of obstructive symptoms: difficulty swallowing No, changes in voice/hoarseness No.  History of radiation to patient's neck: No  Resent iodine exposure: No  Family history includes mother thyroid nodules  Family history of thyroid cancer: No    3. Obesity  Eats healthy, walks  Tried weight watchers several times  Not successful with weight watchers    4. Hyperlipidemia, mixed  No muscle pain    5. Vitamin D deficiency  Has fatigue    6. Menopause  No premature menopause   Has some sweating    7. Alopecia  Diffuse hair loss      THYROID ULTRASOUND   History : nodule   Comparison : none       COMMENTS :    Thyroid size : Right lobe 5.1 x 2.3 x 2.1 cm                                     Left lobe 4.6 x 1.3 x 1.4 cm   Echotexture : normal   Nodules : 3.1 x 2.3 x 1.7 cm mixed cystic/solid nodule in the right upper gland. The solid components demonstrate color Doppler flow. 8 x 8 x 8 mm hypoechoic nodule in the right inferior gland. Impression   IMPRESSION :       Cystic and solid 3.1 cm nodule in the right upper gland for which biopsy is recommended (ACR TR 3). Completed:     01/18/2022   Perform Phys:  Geovanna Cárdenas MD   FINAL DIAGNOSIS:     Thyroid, Right Fine Needle Aspiration:   Non-Diagnostic aspiration specimen. No epithelial component identified. Numerous macrophages and debris present. See case comment. COMMENT:    Insufficient follicular epithelial cells are available for   cytologic evaluation. However, the specimen contains abundant   macrophages with debris and would be supportive of aspiration of a cystic   lesion in the appropriate clinical setting. Clinical correlation is   recommended. .................     ISENC/ISENC       Past Medical History:   Diagnosis Date    Depression     Hyperlipidemia, mixed 4/11/2018    Internal hemorrhoid     New onset type 2 diabetes mellitus ([facility] 4/11/2018    Thyroid nodule       Patient Active Problem List   Diagnosis    Internal hemorrhoid    Adjustment disorder with depressed mood    Vitamin D deficiency    Hyperlipidemia, mixed    Thyroid nodule    Primary insomnia    Multinodular goiter    Class 2 obesity with body mass index (BMI) of 39.0 to 39.9 in adult    Menopause    Alopecia    Class 2 severe obesity with serious comorbidity and body mass index (BMI) of 39.0 to 39.9 in adult [facility])    Class 2 severe obesity with serious comorbidity and body mass index (BMI) of 36.0 to 36.9 in adult [facility])    Class 2 obesity with body mass index (BMI) of 37.0 to 37.9 in adult    Controlled type 2 diabetes mellitus without complication, without long-term current use of insulin (HCC)     Past Surgical History:   Procedure Laterality Date    ENDOMETRIAL ABLATION       Social History     Socioeconomic History    Marital status: Single     Spouse name: Not on file    Number of children: Not on file    Years of education: Not on file    Highest education level: Not on file   Occupational History    Not on file   Tobacco Use    Smoking status: Former     Packs/day: 1.00     Years: 2.00     Pack years: 2.00     Types: Cigarettes     Start date: 1980     Quit date: 2005     Years since quittin.1    Smokeless tobacco: Never   Substance and Sexual Activity    Alcohol use: Not Currently     Comment: social    Drug use: No    Sexual activity: Not on file   Other Topics Concern    Not on file   Social History Narrative    Not on file     Social Determinants of Health     Financial Resource Strain: Low Risk     Difficulty of Paying Living Expenses: Not hard at all   Food Insecurity: No Food Insecurity    Worried About Running Out of Food in the Last Year: Never true    Ran Out of Food in the Last Year: Never true   Transportation Needs: Not on file   Physical Activity: Not on file   Stress: Not on file   Social Connections: Not on file   Intimate Partner Violence: Not on file   Housing Stability: Not on file     Family History   Problem Relation Age of Onset    High Cholesterol Mother     Prostate Cancer Father         mets to aorta     Rheum Arthritis Sister      Current Outpatient Medications   Medication Sig Dispense Refill    Semaglutide, 1 MG/DOSE, 4 MG/3ML SOPN 1 mg weekly 3 mL 2    QUEtiapine (SEROQUEL) 50 MG tablet TAKE 1 TABLET BY MOUTH EVERY DAY AT NIGHT 90 tablet 1    metFORMIN (GLUCOPHAGE-XR) 500 MG extended release tablet TAKE 1 TABLET BY MOUTH TWICE A DAY WITH MEALS 180 tablet 3    vitamin C (ASCORBIC ACID) 500 MG tablet Take 500 mg by mouth in the morning. Cholecalciferol (VITAMIN D3) 125 MCG (5000 UT) TABS Take 5,000 Units by mouth      zinc gluconate 50 MG tablet Take 50 mg by mouth daily      OneTouch Delica Lancets 20I MISC Check blood sugars 2-3 times a day 200 each 5    venlafaxine (EFFEXOR XR) 75 MG extended release capsule 1 tablet po nightly 90 capsule 3    venlafaxine (EFFEXOR XR) 37.5 MG extended release capsule Take 1 capsule by mouth daily (with breakfast) 90 capsule 3    rosuvastatin (CRESTOR) 5 MG tablet Take 1 tablet by mouth nightly (Patient taking differently: Take 5 mg by mouth nightly Indications: Takes every other day.) 90 tablet 3    ONETOUCH ULTRA strip TEST DAILY 100 strip 5    Blood Glucose Monitoring Suppl ([employer] BLOOD GLUCOSE METER) w/Device KIT 1 each by Does not apply route daily NOTE PHARMACY: ONE TOUCH ULTRA METER 1 kit 0    blood glucose test strips (ACCU-CHEK JUSTO) strip 1 each by In Vitro route daily As needed. 100 each 3    Blood Glucose Monitoring Suppl (ACCU-CHEK JUSTO PLUS) w/Device KIT Tid 1 kit 0    multivitamin (THERAGRAN) per tablet Take 1 tablet by mouth daily      aspirin EC 81 MG EC tablet Take 1 tablet by mouth daily (Patient not taking: Reported on 10/18/2022) 90 tablet 3     No current facility-administered medications for this visit.      No Known Allergies  Family Status   Relation Name Status    Mother  Alive    Father   at age 80        aortic carcinoma    Sister  Alive       Lab Review:    Lab Results   Component Value Date/Time    WBC 5.5 2022 08:24 AM    HGB 12.4 2022 08:24 AM    HCT 37.7 2022 08:24 AM    MCV 89.3 2022 08:24 AM     2022 08:24 AM     Lab Results   Component Value Date/Time     2022 08:26 AM    K 4.0 2022 08:26 AM     2022 08:26 AM    CO2 25 2022 08:26 AM    BUN 11 2022 08:26 AM    CREATININE 0.7 2022 08:26 AM GLUCOSE 113 07/13/2022 08:26 AM    CALCIUM 9.2 07/13/2022 08:26 AM    PROT 6.3 07/13/2022 08:24 AM    PROT 7.2 09/06/2011 09:26 AM    LABALBU 4.6 07/13/2022 08:24 AM    BILITOT 0.3 07/13/2022 08:24 AM    ALKPHOS 93 07/13/2022 08:24 AM    AST 15 07/13/2022 08:24 AM    ALT 15 07/13/2022 08:24 AM    LABGLOM >60 07/13/2022 08:26 AM    GFRAA >60 07/13/2022 08:26 AM    GFRAA >60 09/06/2011 09:26 AM    AGRATIO 2.7 07/13/2022 08:24 AM    GLOB 2.5 01/03/2022 08:13 AM     Lab Results   Component Value Date/Time    TSH 1.94 07/13/2022 08:24 AM    FT3 2.9 07/13/2022 08:26 AM     Lab Results   Component Value Date/Time    LABA1C 5.9 07/13/2022 08:24 AM     Lab Results   Component Value Date/Time    .6 07/13/2022 08:24 AM     Lab Results   Component Value Date/Time    CHOL 195 07/13/2022 08:24 AM     Lab Results   Component Value Date/Time    TRIG 155 07/13/2022 08:24 AM     Lab Results   Component Value Date/Time    HDL 39 07/13/2022 08:24 AM    HDL 52 09/06/2011 09:26 AM     Lab Results   Component Value Date/Time    LDLCALC 125 07/13/2022 08:24 AM     Lab Results   Component Value Date/Time    LABVLDL 31 07/13/2022 08:24 AM     No results found for: CHOLHDLRATIO  No results found for: LABMICR, [id number]  Lab Results   Component Value Date/Time    VITD25 26 09/06/2011 09:26 AM        Review of Systems:  Constitutional: has fatigue, no fever, no recent weight gain, no recent weight loss, no changes in appetite  Eyes: no eye pain, no change in vision, no eye redness, no eye irritation, no double vision  Ears, nose, throat: no nasal congestion, no sore throat, no earache, no decrease in hearing, no hoarseness, no dry mouth, no sinus problems, no difficulty swallowing, no neck lumps, no dental problems, no mouth sores, no ringing in ears  Pulmonary: no shortness of breath, no wheezing, no dyspnea on exertion, no cough  Cardiovascular: no chest pain, no lower extremity edema, no orthopnea, no intermittent leg claudication, no palpitations  Gastrointestinal: no abdominal pain, no nausea, no vomiting, no diarrhea, no constipation, no dysphagia, no heartburn, no bloating  Genitourinary: no dysuria, no urinary incontinence, no urinary hesitancy, has urinary frequency, no feelings of urinary urgency, no nocturia  Musculoskeletal: no joint swelling, no joint stiffness, no joint pain, no muscle cramps, no muscle pain, no bone pain  Integument/Breast: has hair loss, no skin rashes, no skin lesions, no itching, has dry skin  Neurological: no numbness, no tingling, no weakness, no confusion, has headaches, no dizziness, no fainting, no tremors, no decrease in memory, no balance problems  Psychiatric: no anxiety, has depression, had insomnia  Hematologic/Lymphatic: no tendency for easy bleeding, no swollen lymph nodes, no tendency for easy bruising  Immunology: has seasonal allergies, no frequent infections, no frequent illnesses  Endocrine: no temperature intolerance    BP (!) 119/90   Pulse 97   Wt 226 lb (102.5 kg)   SpO2 98%   BMI 37.61 kg/m²    Wt Readings from Last 3 Encounters:   10/18/22 226 lb (102.5 kg)   09/15/22 221 lb (100.2 kg)   08/18/22 222 lb (100.7 kg)     Body mass index is 37.61 kg/m².       OBJECTIVE:  Constitutional: no acute distress, well appearing and well nourished  Psychiatric: oriented to person, place and time, judgement and insight and normal, recent and remote memory and intact and mood and affect are normal  Skin: skin and subcutaneous tissue is normal without mass, normal turgor  Head and Face: examination of head and face revealed no abnormalities  Eyes: no lid or conjunctival swelling, erythema or discharge, pupils are normal, equal, round, reactive to light  Ears/Nose: external inspection of ears and nose revealed no abnormalities, hearing is grossly normal  Oropharynx/Mouth/Face: lips, tongue and gums are normal with no lesions, the voice quality was normal  Neck: neck is supple and symmetric, with midline trachea and no masses, thyroid is enlarged, palpable 3 cm right thyroid nodule  Lymphatics: normal cervical lymph nodes, normal supraclavicular nodes  Pulmonary: no increased work of breathing or signs of respiratory distress, lungs are clear to auscultation  Cardiovascular: normal heart rate and rhythm, normal S1 and S2, no murmurs and pedal pulses and 2+ bilaterally, No edema  Abdomen: abdomen is soft, non-tender with no masses  Musculoskeletal: normal gait and station and exam of the digits and nails are normal  Neurological: normal coordination and normal general cortical function    Visual inspection:  Deformity/amputation: absent  Skin lesions/pre-ulcerative calluses: absent  Edema: right- negative, left- negative    Sensory exam:  Monofilament sensation: normal  (minimum of 5 random plantar locations tested, avoiding callused areas - > 1 area with absence of sensation is + for neuropathy)    Plus at least one of the following:  Pulses: normal  Proprioception: Intact  Vibration (128 Hz): Intact    ASSESSMENT/PLAN:    1. Controlled type 2 diabetes mellitus without complication, without long-term current use of insulin (HCC)  Increase Ozempic to 1 mg weekly. Patient tolerates Ozempic well. Hemoglobin A1c 6.7-6.1-6.5-6.8-5.9  Continue Metformin  mg 1 tablet twice a day  -CMP  -Hemoglobin A1c  -Lipid panel  -Microalbumin creatinine ratio    2. Multinodular goiter  TSH 1.94  Thyroid sonogram 12/21/2021-right 3.1 cm and right 0.8 cm nodules. 1/17/2022 FNA cytology: Right 3.1 cm nodule-nondiagnostic. Recommend to repeat FNA of 3.1 cm cystic nodule. [place] when lived in [place]. TPO antibody, thyroglobulin antibody negative  - T3, Free; Future  - T4, Free; Future  - TSH without Reflex; Future  -Thyroid ultrasound    3. Obesity  7/18/2022  Continue diet and exercise  Diet and exercise  Weight watchers were not effective  Started phentermine 7/18/2022   Starting weight 224 pounds.   Ending weight 226 lbs today. Not effective. Tolerating it well. No palpitations, anxiety, insomnia. Counseled on Qsymia. Start next appointment    4. Hyperlipidemia, mixed    Triglycerides 155  HDL 39  Continue rosuvastatin 5 mg daily  -Lipid panel    5. Vitamin D deficiency  Continue multivitamin  -25 hydroxy vitamin D    6. Menopause  - Luteinizing Hormone; Future  - Estradiol; Future  - Follicle Stimulating Hormone; Future  - DHEA-Sulfate; Future  - Testosterone, free, total; Future    7. Alopecia  Improved  Monitor  DHEA-S   Testosterone 39, normal  Free testosterone 7.7, elevated  - DHEA-Sulfate; Future  - Testosterone, free, total; Future        Reviewed and/or ordered clinical lab results Yes  Reviewed and/or ordered radiology tests Yes  Reviewed and/or ordered other diagnostic tests No  Discussed test results with performing physician No  Independently reviewed image, tracing, or specimen No  Made a decision to obtain old records Yes  Reviewed and summarized old records Yes   TSH 2.8-2.27  HbA1C 6.8    Obtained history from other than patient No    Severa Catalina was counseled regarding symptoms of diabetes, thyroid nodule, alopecia, menopause diagnosis, course and complications of disease if inadequately treated, side effects of medications, diagnosis, treatment options, and prognosis, risks, benefits, complications, and alternatives of treatment, labs, imaging and other studies and treatment targets and goals, diabetes complication prevention, diabetes pathophysiology, basal and prandial insulin requirements, on Ozempic, side effects and benefits, weight management, thyroid nodule biopsy, risk of thyroid cancer, thyroid nodule follow-up, weight loss meds, side effects. She understands instructions and counseling. These diagnosis were discussed and reviewed with the patient including the advantages of drug therapy.  She was counseled at this visit on the following: diabetes complication prevention and foot care. Total time I spent for this encounter 30 min    Return in about 3 months (around 1/18/2023) for diabetes.     Electronically signed by Tyshawn Hamilton MD on 10/18/2022 at 10:22 PM

## 2022-10-19 NOTE — TELEPHONE ENCOUNTER
Please call patient and ask if she repeated thyroid nodule FNA? Previous biopsy was nondiagnostic. Order signed in the computer.

## 2022-10-20 NOTE — TELEPHONE ENCOUNTER
Noted. Please let patient know that the order is in the computer and she is welcome to schedule biopsy when she decides to do so. Nodule was previously nondiagnostic, it needs to be biopsied to find out is benign or not.

## 2022-11-29 DIAGNOSIS — E11.9 CONTROLLED TYPE 2 DIABETES MELLITUS WITHOUT COMPLICATION, WITHOUT LONG-TERM CURRENT USE OF INSULIN (HCC): ICD-10-CM

## 2022-11-29 RX ORDER — SEMAGLUTIDE 1.34 MG/ML
INJECTION, SOLUTION SUBCUTANEOUS
OUTPATIENT
Start: 2022-11-29

## 2022-12-06 DIAGNOSIS — E66.01 CLASS 2 SEVERE OBESITY WITH SERIOUS COMORBIDITY AND BODY MASS INDEX (BMI) OF 36.0 TO 36.9 IN ADULT, UNSPECIFIED OBESITY TYPE (HCC): ICD-10-CM

## 2022-12-06 RX ORDER — PHENTERMINE HYDROCHLORIDE 37.5 MG/1
37.5 TABLET ORAL
Qty: 30 TABLET | Refills: 0 | OUTPATIENT
Start: 2022-12-06 | End: 2023-01-05

## 2022-12-16 RX ORDER — BLOOD SUGAR DIAGNOSTIC
STRIP MISCELLANEOUS
Qty: 200 STRIP | Refills: 5 | Status: SHIPPED | OUTPATIENT
Start: 2022-12-16

## 2022-12-16 NOTE — TELEPHONE ENCOUNTER
Medication:   Requested Prescriptions     Pending Prescriptions Disp Refills    ONETOUCH ULTRA strip [Pharmacy Med Name: [address] TEST STRP] 100 strip 5     Sig: USE TO TEST DAILY       Last Filled:  10/22/2021    Patient Phone Number: [phone] (home)     Last appt: 7/13/2022   Next appt: Visit date not found    Last Labs DM:   Lab Results   Component Value Date/Time    LABA1C 6.8 10/15/2022 08:40 AM

## 2023-01-16 DIAGNOSIS — F43.21 ADJUSTMENT DISORDER WITH DEPRESSED MOOD: ICD-10-CM

## 2023-01-16 RX ORDER — VENLAFAXINE HYDROCHLORIDE 37.5 MG/1
37.5 CAPSULE, EXTENDED RELEASE ORAL
Qty: 90 CAPSULE | Refills: 3 | Status: SHIPPED | OUTPATIENT
Start: 2023-01-16

## 2023-01-16 NOTE — TELEPHONE ENCOUNTER
Medication:   Requested Prescriptions     Pending Prescriptions Disp Refills    venlafaxine (EFFEXOR XR) 37.5 MG extended release capsule [Pharmacy Med Name: VENLAFAXINE HCL ER 37.5 MG CAP] 90 capsule 3     Sig: TAKE 1 CAPSULE BY MOUTH DAILY (WITH BREAKFAST). Last Filled:  2/24/2022    Patient Phone Number: [phone] (home)     Last appt: 7/13/2022   Next appt: Visit date not found    Last OARRS: No flowsheet data found.

## 2023-02-14 ENCOUNTER — OFFICE VISIT (OUTPATIENT)
Dept: ENDOCRINOLOGY | Age: 58
End: 2023-02-14
Payer: COMMERCIAL

## 2023-02-14 VITALS
BODY MASS INDEX: 35.49 KG/M2 | HEIGHT: 65 IN | OXYGEN SATURATION: 96 % | HEART RATE: 111 BPM | DIASTOLIC BLOOD PRESSURE: 77 MMHG | WEIGHT: 213 LBS | RESPIRATION RATE: 14 BRPM | TEMPERATURE: 98 F | SYSTOLIC BLOOD PRESSURE: 107 MMHG

## 2023-02-14 DIAGNOSIS — E78.2 HYPERLIPIDEMIA, MIXED: ICD-10-CM

## 2023-02-14 DIAGNOSIS — E55.9 VITAMIN D DEFICIENCY: ICD-10-CM

## 2023-02-14 DIAGNOSIS — E66.01 CLASS 2 SEVERE OBESITY WITH SERIOUS COMORBIDITY AND BODY MASS INDEX (BMI) OF 35.0 TO 35.9 IN ADULT, UNSPECIFIED OBESITY TYPE (HCC): ICD-10-CM

## 2023-02-14 DIAGNOSIS — Z78.0 MENOPAUSE: ICD-10-CM

## 2023-02-14 DIAGNOSIS — E11.9 CONTROLLED TYPE 2 DIABETES MELLITUS WITHOUT COMPLICATION, WITHOUT LONG-TERM CURRENT USE OF INSULIN (HCC): Primary | ICD-10-CM

## 2023-02-14 DIAGNOSIS — E04.2 MULTINODULAR GOITER: ICD-10-CM

## 2023-02-14 DIAGNOSIS — L65.9 ALOPECIA: ICD-10-CM

## 2023-02-14 PROBLEM — E66.812 CLASS 2 OBESITY WITH BODY MASS INDEX (BMI) OF 35.0 TO 35.9 IN ADULT: Status: ACTIVE | Noted: 2023-02-14

## 2023-02-14 PROBLEM — E66.9 CLASS 2 OBESITY WITH BODY MASS INDEX (BMI) OF 35.0 TO 35.9 IN ADULT: Status: ACTIVE | Noted: 2023-02-14

## 2023-02-14 PROCEDURE — G8417 CALC BMI ABV UP PARAM F/U: HCPCS | Performed by: INTERNAL MEDICINE

## 2023-02-14 PROCEDURE — 99214 OFFICE O/P EST MOD 30 MIN: CPT | Performed by: INTERNAL MEDICINE

## 2023-02-14 PROCEDURE — G8427 DOCREV CUR MEDS BY ELIG CLIN: HCPCS | Performed by: INTERNAL MEDICINE

## 2023-02-14 PROCEDURE — 3017F COLORECTAL CA SCREEN DOC REV: CPT | Performed by: INTERNAL MEDICINE

## 2023-02-14 PROCEDURE — G8484 FLU IMMUNIZE NO ADMIN: HCPCS | Performed by: INTERNAL MEDICINE

## 2023-02-14 PROCEDURE — 1036F TOBACCO NON-USER: CPT | Performed by: INTERNAL MEDICINE

## 2023-02-14 PROCEDURE — 2022F DILAT RTA XM EVC RTNOPTHY: CPT | Performed by: INTERNAL MEDICINE

## 2023-02-14 PROCEDURE — 3044F HG A1C LEVEL LT 7.0%: CPT | Performed by: INTERNAL MEDICINE

## 2023-02-14 NOTE — PROGRESS NOTES
Gwen Castillo is a 56 y.o. female who is being evaluated for Type 2 diabetes mellitus. Current symptoms/problems include none and show no change. Controlled type 2 diabetes mellitus without complication, without long-term current use of insulin [facility] [E11.9]    Diagnosed with Type 2 diabetes mellitus in 2014. Comorbid conditions:  hyperlipidemia and Neuropathy    Current diabetic medications include: Metformin  mg bid, Ozempic 1 mg weekly    Intolerance to diabetes medications: No     Weight trend: stable  Prior visit with dietician: yes  Current diet: on average, 3 meals per day  Current exercise: walking     Current monitoring regimen: home blood tests - 1 times daily  Has brought blood glucose log/meter:  No  Home blood sugar records: fasting range:  and postprandial range: 120-130  Any episodes of hypoglycemia? No  Hypoglycemia frequency and time(s):    Does patient have Glucagon emergency kit? No  Does patient have rapid acting carbohydrate? No  Does patient wear a medic alert bracelet or necklace? No    2. Multinodular goiter  This started in 2016. Patient was diagnosed with thyroid nodule. The problem has been unchanged. Previous thyroid studies include: TSH and free thyroxine. Patient started medication in N/A. Currently patient is on: N/A. Misses  N/A doses a month. Current complaints: fatigue, brain fog, difficulty loosing weight  Right 3.1 cm and 0.8 cm thyroid nodules  FNA of right 3.1 cm nodule nondiagnostic 1/2022  Had FNA in [place]  Report not available    History of obstructive symptoms: difficulty swallowing No, changes in voice/hoarseness No.  History of radiation to patient's neck: No  Resent iodine exposure: No  Family history includes mother thyroid nodules  Family history of thyroid cancer: No    3. Obesity  Eats healthy, walks  Tried weight watchers several times  Not successful with weight watchers    4. Hyperlipidemia, mixed  No muscle pain    5.  Vitamin D deficiency  Has fatigue    6. Menopause  No premature menopause   Has some sweating    7. Alopecia  Diffuse hair loss      THYROID ULTRASOUND   History : nodule   Comparison : none       COMMENTS :    Thyroid size : Right lobe 5.1 x 2.3 x 2.1 cm                                     Left lobe 4.6 x 1.3 x 1.4 cm   Echotexture : normal   Nodules : 3.1 x 2.3 x 1.7 cm mixed cystic/solid nodule in the right upper gland. The solid components demonstrate color Doppler flow. 8 x 8 x 8 mm hypoechoic nodule in the right inferior gland. Impression   IMPRESSION :       Cystic and solid 3.1 cm nodule in the right upper gland for which biopsy is recommended (ACR TR 3). Completed:     01/18/2022   Perform Phys:  Octavio Sweeney MD   FINAL DIAGNOSIS:     Thyroid, Right Fine Needle Aspiration:   Non-Diagnostic aspiration specimen. No epithelial component identified. Numerous macrophages and debris present. See case comment. COMMENT:    Insufficient follicular epithelial cells are available for   cytologic evaluation. However, the specimen contains abundant   macrophages with debris and would be supportive of aspiration of a cystic   lesion in the appropriate clinical setting. Clinical correlation is   recommended. .................     ISENC/ISENC       Past Medical History:   Diagnosis Date    Depression     Hyperlipidemia, mixed 4/11/2018    Internal hemorrhoid     New onset type 2 diabetes mellitus ([facility] 4/11/2018    Thyroid nodule       Patient Active Problem List   Diagnosis    Internal hemorrhoid    Adjustment disorder with depressed mood    Vitamin D deficiency    Hyperlipidemia, mixed    Thyroid nodule    Primary insomnia    Multinodular goiter    Class 2 obesity with body mass index (BMI) of 39.0 to 39.9 in adult    Menopause    Alopecia    Class 2 severe obesity with serious comorbidity and body mass index (BMI) of 39.0 to 39.9 in adult [facility])    Class 2 severe obesity with serious comorbidity and body mass index (BMI) of 36.0 to 36.9 in adult [facility])    Class 2 obesity with body mass index (BMI) of 37.0 to 37.9 in adult    Controlled type 2 diabetes mellitus without complication, without long-term current use of insulin (HCC)    Class 2 obesity with body mass index (BMI) of 35.0 to 35.9 in adult     Past Surgical History:   Procedure Laterality Date    ENDOMETRIAL ABLATION       Social History     Socioeconomic History    Marital status: Single     Spouse name: Not on file    Number of children: Not on file    Years of education: Not on file    Highest education level: Not on file   Occupational History    Not on file   Tobacco Use    Smoking status: Former     Packs/day: 1.00     Years: 2.00     Pack years: 2.00     Types: Cigarettes     Start date: 1980     Quit date: 2005     Years since quittin.5    Smokeless tobacco: Never   Substance and Sexual Activity    Alcohol use: Not Currently     Comment: social    Drug use: No    Sexual activity: Not on file   Other Topics Concern    Not on file   Social History Narrative    Not on file     Social Determinants of Health     Financial Resource Strain: Low Risk     Difficulty of Paying Living Expenses: Not hard at all   Food Insecurity: No Food Insecurity    Worried About Running Out of Food in the Last Year: Never true    Ran Out of Food in the Last Year: Never true   Transportation Needs: Not on file   Physical Activity: Not on file   Stress: Not on file   Social Connections: Not on file   Intimate Partner Violence: Not on file   Housing Stability: Not on file     Family History   Problem Relation Age of Onset    High Cholesterol Mother     Prostate Cancer Father         mets to aorta     Rheum Arthritis Sister      Current Outpatient Medications   Medication Sig Dispense Refill    venlafaxine (EFFEXOR XR) 37.5 MG extended release capsule TAKE 1 CAPSULE BY MOUTH DAILY (WITH BREAKFAST).  90 capsule 3    blood glucose test strips (ONETOUCH ULTRA) strip Used to test 1-2 times daily 200 strip 5    Semaglutide, 1 MG/DOSE, 4 MG/3ML SOPN 1 mg weekly 3 mL 2    QUEtiapine (SEROQUEL) 50 MG tablet TAKE 1 TABLET BY MOUTH EVERY DAY AT NIGHT 90 tablet 1    metFORMIN (GLUCOPHAGE-XR) 500 MG extended release tablet TAKE 1 TABLET BY MOUTH TWICE A DAY WITH MEALS 180 tablet 3    vitamin C (ASCORBIC ACID) 500 MG tablet Take 500 mg by mouth in the morning. Cholecalciferol (VITAMIN D3) 125 MCG (5000 UT) TABS Take 5,000 Units by mouth      zinc gluconate 50 MG tablet Take 50 mg by mouth daily      OneTouch Delica Lancets 81Z MISC Check blood sugars 2-3 times a day 200 each 5    venlafaxine (EFFEXOR XR) 75 MG extended release capsule 1 tablet po nightly 90 capsule 3    rosuvastatin (CRESTOR) 5 MG tablet Take 1 tablet by mouth nightly (Patient taking differently: Take 5 mg by mouth nightly Indications: Takes every other day.) 90 tablet 3    Blood Glucose Monitoring Suppl ([employer] BLOOD GLUCOSE METER) w/Device KIT 1 each by Does not apply route daily NOTE PHARMACY: ONE TOUCH ULTRA METER 1 kit 0    blood glucose test strips (ACCU-CHEK JUSTO) strip 1 each by In Vitro route daily As needed. 100 each 3    Blood Glucose Monitoring Suppl (ACCU-CHEK JUSTO PLUS) w/Device KIT Tid 1 kit 0    multivitamin (THERAGRAN) per tablet Take 1 tablet by mouth daily      aspirin EC 81 MG EC tablet Take 1 tablet by mouth daily (Patient not taking: Reported on 10/18/2022) 90 tablet 3     No current facility-administered medications for this visit.      No Known Allergies  Family Status   Relation Name Status    Mother  Alive    Father   at age 80        aortic carcinoma    Sister  Alive       Lab Review:    Lab Results   Component Value Date/Time    WBC 5.5 2022 08:24 AM    HGB 12.4 2022 08:24 AM    HCT 37.7 2022 08:24 AM    MCV 89.3 2022 08:24 AM     2022 08:24 AM     Lab Results   Component Value Date/Time     2023 08:32 AM    K 4.2 02/13/2023 08:32 AM     02/13/2023 08:32 AM    CO2 26 02/13/2023 08:32 AM    BUN 14 02/13/2023 08:32 AM    CREATININE 0.7 02/13/2023 08:32 AM    GLUCOSE 93 02/13/2023 08:32 AM    CALCIUM 9.5 02/13/2023 08:32 AM    PROT 6.4 02/13/2023 08:32 AM    PROT 7.2 09/06/2011 09:26 AM    LABALBU 4.1 02/13/2023 08:32 AM    BILITOT 0.4 02/13/2023 08:32 AM    ALKPHOS 95 02/13/2023 08:32 AM    AST 11 02/13/2023 08:32 AM    ALT 8 02/13/2023 08:32 AM    LABGLOM >60 02/13/2023 08:32 AM    GFRAA >60 07/13/2022 08:26 AM    GFRAA >60 09/06/2011 09:26 AM    AGRATIO 1.8 02/13/2023 08:32 AM    GLOB 2.1 10/15/2022 08:40 AM     Lab Results   Component Value Date/Time    TSH 1.76 02/13/2023 08:32 AM    FT3 2.4 02/13/2023 08:32 AM     Lab Results   Component Value Date/Time    LABA1C 5.6 02/13/2023 08:32 AM     Lab Results   Component Value Date/Time    .0 02/13/2023 08:32 AM     Lab Results   Component Value Date/Time    CHOL 237 02/13/2023 08:32 AM     Lab Results   Component Value Date/Time    TRIG 132 02/13/2023 08:32 AM     Lab Results   Component Value Date/Time    HDL 41 02/13/2023 08:32 AM    HDL 52 09/06/2011 09:26 AM     Lab Results   Component Value Date/Time    LDLCALC 170 02/13/2023 08:32 AM     Lab Results   Component Value Date/Time    LABVLDL 26 02/13/2023 08:32 AM     No results found for: CHOLHDLRATIO  Lab Results   Component Value Date/Time    LABMICR <1.20 02/13/2023 01:02 PM     Lab Results   Component Value Date/Time    VITD25 60.2 02/13/2023 08:32 AM        Review of Systems:  Constitutional: has fatigue, no fever, no recent weight gain, no recent weight loss, no changes in appetite  Eyes: no eye pain, no change in vision, no eye redness, no eye irritation, no double vision  Ears, nose, throat: no nasal congestion, no sore throat, no earache, no decrease in hearing, no hoarseness, no dry mouth, no sinus problems, no difficulty swallowing, no neck lumps, no dental problems, no mouth sores, no ringing in ears  Pulmonary: no shortness of breath, no wheezing, no dyspnea on exertion, no cough  Cardiovascular: no chest pain, no lower extremity edema, no orthopnea, no intermittent leg claudication, no palpitations  Gastrointestinal: no abdominal pain, no nausea, no vomiting, no diarrhea, no constipation, no dysphagia, no heartburn, no bloating  Genitourinary: no dysuria, no urinary incontinence, no urinary hesitancy, has urinary frequency, no feelings of urinary urgency, no nocturia  Musculoskeletal: no joint swelling, no joint stiffness, no joint pain, no muscle cramps, no muscle pain, no bone pain  Integument/Breast: has hair loss, no skin rashes, no skin lesions, no itching, has dry skin  Neurological: no numbness, no tingling, no weakness, no confusion, has headaches, no dizziness, no fainting, no tremors, no decrease in memory, no balance problems  Psychiatric: no anxiety, has depression, had insomnia  Hematologic/Lymphatic: no tendency for easy bleeding, no swollen lymph nodes, no tendency for easy bruising  Immunology: has seasonal allergies, no frequent infections, no frequent illnesses  Endocrine: no temperature intolerance    /77   Pulse (!) 111   Temp 98 °F (36.7 °C)   Resp 14   Ht 5' 5\" (1.651 m)   Wt 213 lb (96.6 kg)   SpO2 96%   BMI 35.45 kg/m²    Wt Readings from Last 3 Encounters:   02/14/23 213 lb (96.6 kg)   10/18/22 226 lb (102.5 kg)   09/15/22 221 lb (100.2 kg)     Body mass index is 35.45 kg/m².       OBJECTIVE:  Constitutional: no acute distress, well appearing and well nourished  Psychiatric: oriented to person, place and time, judgement and insight and normal, recent and remote memory and intact and mood and affect are normal  Skin: skin and subcutaneous tissue is normal without mass, normal turgor  Head and Face: examination of head and face revealed no abnormalities  Eyes: no lid or conjunctival swelling, erythema or discharge, pupils are normal, equal, round, reactive to light  Ears/Nose: external inspection of ears and nose revealed no abnormalities, hearing is grossly normal  Oropharynx/Mouth/Face: lips, tongue and gums are normal with no lesions, the voice quality was normal  Neck: neck is supple and symmetric, with midline trachea and no masses, thyroid is enlarged, palpable 3 cm right thyroid nodule  Lymphatics: normal cervical lymph nodes, normal supraclavicular nodes  Pulmonary: no increased work of breathing or signs of respiratory distress, lungs are clear to auscultation  Cardiovascular: normal heart rate and rhythm, normal S1 and S2, no murmurs and pedal pulses and 2+ bilaterally, No edema  Abdomen: abdomen is soft, non-tender with no masses  Musculoskeletal: normal gait and station and exam of the digits and nails are normal  Neurological: normal coordination and normal general cortical function    Visual inspection:  Deformity/amputation: absent  Skin lesions/pre-ulcerative calluses: absent  Edema: right- negative, left- negative    Sensory exam:  Monofilament sensation: normal  (minimum of 5 random plantar locations tested, avoiding callused areas - > 1 area with absence of sensation is + for neuropathy)    Plus at least one of the following:  Pulses: normal  Proprioception: Intact  Vibration (128 Hz): Intact    ASSESSMENT/PLAN:    1. Controlled type 2 diabetes mellitus without complication, without long-term current use of insulin (HCC)  Continue Ozempic 1 mg weekly. Patient tolerates Ozempic well. Hemoglobin A1c 6.7-6.1-6.5-6.8-5.9-6.8-5.6  Continue Metformin  mg 1 tablet twice a day  -CMP  -Hemoglobin A1c  -Lipid panel  -Microalbumin creatinine ratio    2. Multinodular goiter  TSH 1.94-1.76  Thyroid sonogram 12/21/2021-right 3.1 cm and right 0.8 cm nodules. 1/17/2022 FNA cytology: Right 3.1 cm nodule-nondiagnostic. Recommend to repeat FNA of 3.1 cm cystic nodule. Repeat thyroid US, then order to repeat FNA  [place] when lived in [place].     TPO antibody, thyroglobulin antibody negative  - T3, Free; Future  - T4, Free; Future  - TSH without Reflex; Future  -Thyroid ultrasound    3. Obesity  7/18/2022  Continue diet and exercise  Diet and exercise  Weight watchers were not effective  Started phentermine 7/18/2022   Starting weight 224 pounds. Ending weight 226 lbs today. Not effective. Tolerating it well. No palpitations, anxiety, insomnia. Counseled on Qsymia. Start next appointment    4. Hyperlipidemia, mixed  Worsening  -170  Triglycerides 155-132  HDL 39-41  Restart rosuvastatin 5 mg daily  -Lipid panel    5. Vitamin D deficiency  25 hydroxy vitamin D 60.2  Calcium 9.5  Continue multivitamin, vitamin D  -25 hydroxy vitamin D    6. Menopause  - Luteinizing Hormone; Future  - Estradiol; Future  - Follicle Stimulating Hormone; Future  - DHEA-Sulfate; Future  - Testosterone, free, total; Future    7. Alopecia  Improved  Monitor  DHEA-S   Testosterone 39, normal  Free testosterone 7.7, elevated  - DHEA-Sulfate;  Future  - Testosterone, free, total; Future        Reviewed and/or ordered clinical lab results Yes  Reviewed and/or ordered radiology tests Yes  Reviewed and/or ordered other diagnostic tests No  Discussed test results with performing physician No  Independently reviewed image, tracing, or specimen No  Made a decision to obtain old records Yes  Reviewed and summarized old records Yes   TSH 2.8-2.27  HbA1C 6.8    Obtained history from other than patient No    Chela Oscar was counseled regarding symptoms of diabetes, thyroid nodule, alopecia, menopause diagnosis, course and complications of disease if inadequately treated, side effects of medications, diagnosis, treatment options, and prognosis, risks, benefits, complications, and alternatives of treatment, labs, imaging and other studies and treatment targets and goals, diabetes complication prevention, diabetes pathophysiology, basal and prandial insulin requirements, on Ozempic, side effects and benefits, weight management, thyroid nodule biopsy, risk of thyroid cancer, thyroid nodule follow-up, weight loss meds, side effects. She understands instructions and counseling. These diagnosis were discussed and reviewed with the patient including the advantages of drug therapy. She was counseled at this visit on the following: diabetes complication prevention and foot care. Return in about 3 months (around 5/14/2023) for diabetes.     Electronically signed by Antonio Zhu MD on 2/14/2023 at 8:58 AM

## 2023-03-25 DIAGNOSIS — E11.9 CONTROLLED TYPE 2 DIABETES MELLITUS WITHOUT COMPLICATION, WITHOUT LONG-TERM CURRENT USE OF INSULIN (HCC): ICD-10-CM

## 2023-03-25 DIAGNOSIS — L65.9 ALOPECIA: ICD-10-CM

## 2023-03-27 RX ORDER — SEMAGLUTIDE 1.34 MG/ML
INJECTION, SOLUTION SUBCUTANEOUS
Qty: 9 ML | Refills: 0 | Status: SHIPPED | OUTPATIENT
Start: 2023-03-27

## 2023-04-04 ENCOUNTER — HOSPITAL ENCOUNTER (OUTPATIENT)
Dept: ULTRASOUND IMAGING | Age: 58
Discharge: HOME | End: 2023-04-04

## 2023-04-04 DIAGNOSIS — E04.2 MULTINODULAR GOITER: ICD-10-CM

## 2023-04-28 ENCOUNTER — TELEPHONE (OUTPATIENT)
Dept: FAMILY MEDICINE CLINIC | Age: 58
End: 2023-04-28

## 2023-04-28 DIAGNOSIS — E78.2 HYPERLIPIDEMIA, MIXED: ICD-10-CM

## 2023-04-28 DIAGNOSIS — F43.21 ADJUSTMENT DISORDER WITH DEPRESSED MOOD: ICD-10-CM

## 2023-04-28 DIAGNOSIS — E11.9 TYPE 2 DIABETES MELLITUS WITHOUT COMPLICATION, WITHOUT LONG-TERM CURRENT USE OF INSULIN (HCC): ICD-10-CM

## 2023-04-28 RX ORDER — VENLAFAXINE HYDROCHLORIDE 75 MG/1
CAPSULE, EXTENDED RELEASE ORAL
Qty: 90 CAPSULE | Refills: 3 | Status: SHIPPED | OUTPATIENT
Start: 2023-04-28

## 2023-04-28 RX ORDER — ROSUVASTATIN CALCIUM 5 MG/1
5 TABLET, COATED ORAL NIGHTLY
Qty: 90 TABLET | Refills: 1 | Status: SHIPPED | OUTPATIENT
Start: 2023-04-28

## 2023-04-28 RX ORDER — QUETIAPINE FUMARATE 50 MG/1
TABLET, FILM COATED ORAL
Qty: 90 TABLET | Refills: 1 | Status: SHIPPED | OUTPATIENT
Start: 2023-04-28

## 2023-04-28 NOTE — TELEPHONE ENCOUNTER
Medication:   Requested Prescriptions     Pending Prescriptions Disp Refills    QUEtiapine (SEROQUEL) 50 MG tablet [Pharmacy Med Name: QUETIAPINE FUMARATE 50 MG TAB] 90 tablet 1     Sig: TAKE 1 TABLET BY MOUTH EVERY DAY AT NIGHT    rosuvastatin (CRESTOR) 5 MG tablet [Pharmacy Med Name: ROSUVASTATIN CALCIUM 5 MG TAB] 90 tablet 3     Sig: TAKE 1 TABLET BY MOUTH NIGHTLY    venlafaxine (EFFEXOR XR) 75 MG extended release capsule [Pharmacy Med Name: VENLAFAXINE HCL ER 75 MG CAP] 90 capsule 3     Sig: TAKE 1 CAPSULE BY MOUTH NIGHTLY       Last Filled:  8/16/2022 (seroquel) 2/24/2022 (crestor) 1/16/2023 (effexor)     Patient Phone Number: [phone] (home)     Last appt: 4/11/2023   Next appt: Visit date not found    Last Lipid:   Lab Results   Component Value Date/Time    CHOL 237 02/13/2023 08:32 AM    TRIG 132 02/13/2023 08:32 AM    HDL 41 02/13/2023 08:32 AM    HDL 52 09/06/2011 09:26 AM    LDLCALC 170 02/13/2023 08:32 AM

## 2023-05-01 NOTE — TELEPHONE ENCOUNTER
Per Davon Rodrigez, pt called and stated she is confused about seeing Dr Latrice Dee. The last message I sent to you is beneath.

## 2023-05-29 DIAGNOSIS — R09.82 POSTNASAL DRIP: ICD-10-CM

## 2023-05-30 RX ORDER — FLUTICASONE PROPIONATE 50 MCG
SPRAY, SUSPENSION (ML) NASAL
Qty: 16 G | Refills: 3 | Status: SHIPPED | OUTPATIENT
Start: 2023-05-30

## 2023-05-30 NOTE — TELEPHONE ENCOUNTER
Medication:   Requested Prescriptions     Pending Prescriptions Disp Refills    fluticasone (FLONASE) 50 MCG/ACT nasal spray [Pharmacy Med Name: FLUTICASONE PROP 50 MCG SPRAY]       Sig: SPRAY 1 SPRAY INTO EACH NOSTRIL EVERY DAY        Last Filled:  4/11/2023    Patient Phone Number: [phone] (home)     Last appt: 4/11/2023   Next appt: Visit date not found    Last OARRS: No flowsheet data found.

## 2023-07-06 DIAGNOSIS — E11.9 CONTROLLED TYPE 2 DIABETES MELLITUS WITHOUT COMPLICATION, WITHOUT LONG-TERM CURRENT USE OF INSULIN (HCC): ICD-10-CM

## 2023-07-06 DIAGNOSIS — L65.9 ALOPECIA: ICD-10-CM

## 2023-07-06 RX ORDER — SEMAGLUTIDE 1.34 MG/ML
INJECTION, SOLUTION SUBCUTANEOUS
Qty: 9 ML | Refills: 0 | Status: SHIPPED | OUTPATIENT
Start: 2023-07-06

## 2023-10-30 DIAGNOSIS — E11.9 TYPE 2 DIABETES MELLITUS WITHOUT COMPLICATIONS (HCC): ICD-10-CM

## 2023-11-02 RX ORDER — METFORMIN HYDROCHLORIDE 500 MG/1
TABLET, EXTENDED RELEASE ORAL
Qty: 180 TABLET | Refills: 3 | Status: SHIPPED | OUTPATIENT
Start: 2023-11-02

## 2023-11-02 RX ORDER — QUETIAPINE FUMARATE 50 MG/1
TABLET, FILM COATED ORAL
Qty: 90 TABLET | Refills: 1 | Status: SHIPPED | OUTPATIENT
Start: 2023-11-02

## 2023-11-02 NOTE — TELEPHONE ENCOUNTER
Medication:   Requested Prescriptions     Pending Prescriptions Disp Refills    QUEtiapine (SEROQUEL) 50 MG tablet [Pharmacy Med Name: QUETIAPINE FUMARATE 50 MG TAB] 90 tablet 1     Sig: TAKE 1 TABLET BY MOUTH EVERY DAY AT NIGHT    metFORMIN (GLUCOPHAGE-XR) 500 MG extended release tablet [Pharmacy Med Name: METFORMIN HCL  MG TABLET] 180 tablet 3     Sig: TAKE 1 TABLET BY MOUTH TWICE A DAY WITH MEALS       Last Filled:    4/28/2023  Patient Phone Number: [phone] (home)     Last appt: 4/11/2023   Next appt: Visit date not found    Last Labs DM:   Lab Results   Component Value Date/Time    LABA1C 5.6 02/13/2023 08:32 AM

## 2023-11-29 DIAGNOSIS — R09.82 POSTNASAL DRIP: ICD-10-CM

## 2023-11-29 RX ORDER — FLUTICASONE PROPIONATE 50 MCG
SPRAY, SUSPENSION (ML) NASAL
Qty: 1 EACH | Refills: 3 | Status: SHIPPED | OUTPATIENT
Start: 2023-11-29

## 2023-12-07 DIAGNOSIS — F43.21 ADJUSTMENT DISORDER WITH DEPRESSED MOOD: ICD-10-CM

## 2023-12-07 RX ORDER — VENLAFAXINE HYDROCHLORIDE 37.5 MG/1
37.5 CAPSULE, EXTENDED RELEASE ORAL
Qty: 90 CAPSULE | Refills: 1 | Status: SHIPPED | OUTPATIENT
Start: 2023-12-07

## 2023-12-07 NOTE — TELEPHONE ENCOUNTER
Medication:   Requested Prescriptions     Pending Prescriptions Disp Refills    venlafaxine (EFFEXOR XR) 37.5 MG extended release capsule 90 capsule 3     Sig: Take 1 capsule by mouth daily (with breakfast)        Last Filled:  1/16/23    Patient Phone Number: [phone] (home)     Last appt: 4/11/2023   Next appt: Visit date not found    Last OARRS:        No data to display

## 2024-04-15 ENCOUNTER — PATIENT MESSAGE (OUTPATIENT)
Dept: FAMILY MEDICINE CLINIC | Age: 59
End: 2024-04-15

## 2024-04-15 DIAGNOSIS — Z00.00 ANNUAL PHYSICAL EXAM: ICD-10-CM

## 2024-04-15 DIAGNOSIS — E04.1 THYROID NODULE: ICD-10-CM

## 2024-04-15 DIAGNOSIS — E78.2 HYPERLIPIDEMIA, MIXED: ICD-10-CM

## 2024-04-15 DIAGNOSIS — E11.9 TYPE 2 DIABETES MELLITUS WITHOUT COMPLICATION, WITHOUT LONG-TERM CURRENT USE OF INSULIN (HCC): Primary | ICD-10-CM

## 2024-04-16 NOTE — TELEPHONE ENCOUNTER
From: Macie Bang  To: Dr. Adriane Rinaldi  Sent: 4/15/2024 12:30 PM EDT  Subject: Appt May 3rd - bloodwork    Good morning Dr. Rinaldi!    I scheduled my annual physical with you for May 3rd. Would you be able to put in blood work so I can have it completed so it's available for my appointment?    Thank you and have a great day. See you in a few weeks.    Macie Bang

## 2024-04-22 DIAGNOSIS — F43.21 ADJUSTMENT DISORDER WITH DEPRESSED MOOD: ICD-10-CM

## 2024-04-23 RX ORDER — VENLAFAXINE HYDROCHLORIDE 75 MG/1
CAPSULE, EXTENDED RELEASE ORAL
Qty: 30 CAPSULE | Refills: 0 | Status: SHIPPED | OUTPATIENT
Start: 2024-04-23

## 2024-04-23 RX ORDER — QUETIAPINE FUMARATE 50 MG/1
TABLET, FILM COATED ORAL
Qty: 30 TABLET | Refills: 0 | Status: SHIPPED | OUTPATIENT
Start: 2024-04-23

## 2024-04-23 NOTE — TELEPHONE ENCOUNTER
Lov 4/11/23  Lrf 90 1 11/2/23  90 3 4/28/23  Medication:   Requested Prescriptions     Pending Prescriptions Disp Refills    QUEtiapine (SEROQUEL) 50 MG tablet [Pharmacy Med Name: QUETIAPINE FUMARATE 50 MG TAB] 90 tablet 1     Sig: TAKE 1 TABLET BY MOUTH EVERY DAY AT NIGHT    venlafaxine (EFFEXOR XR) 75 MG extended release capsule [Pharmacy Med Name: VENLAFAXINE HCL ER 75 MG CAP] 90 capsule 3     Sig: TAKE 1 CAPSULE BY MOUTH EVERY DAY AT NIGHT        Last Filled:      Patient Phone Number: [phone] (home)     Last appt: 4/11/2023   Next appt: 5/3/2024    Last OARRS:        No data to display

## 2024-05-02 DIAGNOSIS — E78.2 HYPERLIPIDEMIA, MIXED: ICD-10-CM

## 2024-05-02 DIAGNOSIS — E04.1 THYROID NODULE: ICD-10-CM

## 2024-05-02 DIAGNOSIS — Z00.00 ANNUAL PHYSICAL EXAM: ICD-10-CM

## 2024-05-02 DIAGNOSIS — E11.9 TYPE 2 DIABETES MELLITUS WITHOUT COMPLICATION, WITHOUT LONG-TERM CURRENT USE OF INSULIN (HCC): ICD-10-CM

## 2024-05-02 LAB
ALBUMIN SERPL-MCNC: 4.4 G/DL (ref 3.4–5)
ALBUMIN/GLOB SERPL: 2.4 {RATIO} (ref 1.1–2.2)
ALP SERPL-CCNC: 101 U/L (ref 40–129)
ALT SERPL-CCNC: 11 U/L (ref 10–40)
ANION GAP SERPL CALCULATED.3IONS-SCNC: 14 MMOL/L (ref 3–16)
AST SERPL-CCNC: 11 U/L (ref 15–37)
BASOPHILS # BLD: 0 K/UL (ref 0–0.2)
BASOPHILS NFR BLD: 0.3 %
BILIRUB SERPL-MCNC: <0.2 MG/DL (ref 0–1)
BUN SERPL-MCNC: 13 MG/DL (ref 7–20)
CALCIUM SERPL-MCNC: 9.1 MG/DL (ref 8.3–10.6)
CHLORIDE SERPL-SCNC: 103 MMOL/L (ref 99–110)
CHOLEST SERPL-MCNC: 152 MG/DL (ref 0–199)
CO2 SERPL-SCNC: 24 MMOL/L (ref 21–32)
CREAT SERPL-MCNC: 0.7 MG/DL (ref 0.6–1.1)
DEPRECATED RDW RBC AUTO: 13.2 % (ref 12.4–15.4)
EOSINOPHIL # BLD: 0.1 K/UL (ref 0–0.6)
EOSINOPHIL NFR BLD: 2.2 %
FOLATE SERPL-MCNC: 6.88 NG/ML (ref 4.78–24.2)
GFR SERPLBLD CREATININE-BSD FMLA CKD-EPI: >90 ML/MIN/{1.73_M2}
GLUCOSE SERPL-MCNC: 170 MG/DL (ref 70–99)
HCT VFR BLD AUTO: 36.6 % (ref 36–48)
HDLC SERPL-MCNC: 45 MG/DL (ref 40–60)
HGB BLD-MCNC: 12.7 G/DL (ref 12–16)
LDLC SERPL CALC-MCNC: 75 MG/DL
LYMPHOCYTES # BLD: 1.6 K/UL (ref 1–5.1)
LYMPHOCYTES NFR BLD: 26.2 %
MCH RBC QN AUTO: 29.5 PG (ref 26–34)
MCHC RBC AUTO-ENTMCNC: 34.8 G/DL (ref 31–36)
MCV RBC AUTO: 84.9 FL (ref 80–100)
MONOCYTES # BLD: 0.4 K/UL (ref 0–1.3)
MONOCYTES NFR BLD: 6.1 %
NEUTROPHILS # BLD: 4 K/UL (ref 1.7–7.7)
NEUTROPHILS NFR BLD: 65.2 %
PLATELET # BLD AUTO: 270 K/UL (ref 135–450)
PMV BLD AUTO: 9.8 FL (ref 5–10.5)
POTASSIUM SERPL-SCNC: 4.5 MMOL/L (ref 3.5–5.1)
PROT SERPL-MCNC: 6.2 G/DL (ref 6.4–8.2)
RBC # BLD AUTO: 4.31 M/UL (ref 4–5.2)
SODIUM SERPL-SCNC: 141 MMOL/L (ref 136–145)
TRIGL SERPL-MCNC: 158 MG/DL (ref 0–150)
TSH SERPL DL<=0.005 MIU/L-ACNC: 2.16 UIU/ML (ref 0.27–4.2)
VIT B12 SERPL-MCNC: 457 PG/ML (ref 211–911)
VLDLC SERPL CALC-MCNC: 32 MG/DL
WBC # BLD AUTO: 6.1 K/UL (ref 4–11)

## 2024-05-02 ASSESSMENT — PATIENT HEALTH QUESTIONNAIRE - PHQ9
SUM OF ALL RESPONSES TO PHQ QUESTIONS 1-9: 12
SUM OF ALL RESPONSES TO PHQ9 QUESTIONS 1 & 2: 3
9. THOUGHTS THAT YOU WOULD BE BETTER OFF DEAD, OR OF HURTING YOURSELF: NOT AT ALL
7. TROUBLE CONCENTRATING ON THINGS, SUCH AS READING THE NEWSPAPER OR WATCHING TELEVISION: MORE THAN HALF THE DAYS
6. FEELING BAD ABOUT YOURSELF - OR THAT YOU ARE A FAILURE OR HAVE LET YOURSELF OR YOUR FAMILY DOWN: MORE THAN HALF THE DAYS
5. POOR APPETITE OR OVEREATING: SEVERAL DAYS
3. TROUBLE FALLING OR STAYING ASLEEP: SEVERAL DAYS
3. TROUBLE FALLING OR STAYING ASLEEP: SEVERAL DAYS
6. FEELING BAD ABOUT YOURSELF - OR THAT YOU ARE A FAILURE OR HAVE LET YOURSELF OR YOUR FAMILY DOWN: MORE THAN HALF THE DAYS
5. POOR APPETITE OR OVEREATING: SEVERAL DAYS
SUM OF ALL RESPONSES TO PHQ QUESTIONS 1-9: 12
2. FEELING DOWN, DEPRESSED OR HOPELESS: SEVERAL DAYS
4. FEELING TIRED OR HAVING LITTLE ENERGY: NEARLY EVERY DAY
1. LITTLE INTEREST OR PLEASURE IN DOING THINGS: MORE THAN HALF THE DAYS
1. LITTLE INTEREST OR PLEASURE IN DOING THINGS: MORE THAN HALF THE DAYS
4. FEELING TIRED OR HAVING LITTLE ENERGY: NEARLY EVERY DAY
9. THOUGHTS THAT YOU WOULD BE BETTER OFF DEAD, OR OF HURTING YOURSELF: NOT AT ALL
8. MOVING OR SPEAKING SO SLOWLY THAT OTHER PEOPLE COULD HAVE NOTICED. OR THE OPPOSITE - BEING SO FIDGETY OR RESTLESS THAT YOU HAVE BEEN MOVING AROUND A LOT MORE THAN USUAL: NOT AT ALL
8. MOVING OR SPEAKING SO SLOWLY THAT OTHER PEOPLE COULD HAVE NOTICED. OR THE OPPOSITE, BEING SO FIGETY OR RESTLESS THAT YOU HAVE BEEN MOVING AROUND A LOT MORE THAN USUAL: NOT AT ALL
SUM OF ALL RESPONSES TO PHQ QUESTIONS 1-9: 12
2. FEELING DOWN, DEPRESSED OR HOPELESS: SEVERAL DAYS
10. IF YOU CHECKED OFF ANY PROBLEMS, HOW DIFFICULT HAVE THESE PROBLEMS MADE IT FOR YOU TO DO YOUR WORK, TAKE CARE OF THINGS AT HOME, OR GET ALONG WITH OTHER PEOPLE: SOMEWHAT DIFFICULT
SUM OF ALL RESPONSES TO PHQ9 QUESTIONS 1 & 2: 3
10. IF YOU CHECKED OFF ANY PROBLEMS, HOW DIFFICULT HAVE THESE PROBLEMS MADE IT FOR YOU TO DO YOUR WORK, TAKE CARE OF THINGS AT HOME, OR GET ALONG WITH OTHER PEOPLE: SOMEWHAT DIFFICULT
SUM OF ALL RESPONSES TO PHQ QUESTIONS 1-9: 12
7. TROUBLE CONCENTRATING ON THINGS, SUCH AS READING THE NEWSPAPER OR WATCHING TELEVISION: MORE THAN HALF THE DAYS
SUM OF ALL RESPONSES TO PHQ QUESTIONS 1-9: 12

## 2024-05-03 ENCOUNTER — OFFICE VISIT (OUTPATIENT)
Dept: FAMILY MEDICINE CLINIC | Age: 59
End: 2024-05-03

## 2024-05-03 VITALS
HEIGHT: 65 IN | HEART RATE: 81 BPM | WEIGHT: 236 LBS | OXYGEN SATURATION: 98 % | DIASTOLIC BLOOD PRESSURE: 83 MMHG | SYSTOLIC BLOOD PRESSURE: 122 MMHG | BODY MASS INDEX: 39.32 KG/M2

## 2024-05-03 DIAGNOSIS — Z00.00 ANNUAL PHYSICAL EXAM: Primary | ICD-10-CM

## 2024-05-03 DIAGNOSIS — F43.21 ADJUSTMENT DISORDER WITH DEPRESSED MOOD: ICD-10-CM

## 2024-05-03 DIAGNOSIS — Z12.31 BREAST CANCER SCREENING BY MAMMOGRAM: ICD-10-CM

## 2024-05-03 DIAGNOSIS — E55.9 VITAMIN D DEFICIENCY: ICD-10-CM

## 2024-05-03 DIAGNOSIS — D17.1 LIPOMA OF TORSO: ICD-10-CM

## 2024-05-03 DIAGNOSIS — E66.01 CLASS 2 SEVERE OBESITY DUE TO EXCESS CALORIES WITH SERIOUS COMORBIDITY AND BODY MASS INDEX (BMI) OF 39.0 TO 39.9 IN ADULT (HCC): ICD-10-CM

## 2024-05-03 DIAGNOSIS — E11.9 CONTROLLED TYPE 2 DIABETES MELLITUS WITHOUT COMPLICATION, WITHOUT LONG-TERM CURRENT USE OF INSULIN (HCC): ICD-10-CM

## 2024-05-03 DIAGNOSIS — E53.8 LOW FOLIC ACID: ICD-10-CM

## 2024-05-03 DIAGNOSIS — Z12.11 COLON CANCER SCREENING: ICD-10-CM

## 2024-05-03 LAB
BILIRUBIN, POC: NORMAL
BLOOD URINE, POC: NORMAL
CLARITY, POC: CLEAR
COLOR, POC: YELLOW
EST. AVERAGE GLUCOSE BLD GHB EST-MCNC: 139.9 MG/DL
GLUCOSE URINE, POC: NORMAL
HBA1C MFR BLD: 6.5 %
KETONES, POC: NORMAL
LEUKOCYTE EST, POC: NORMAL
NITRITE, POC: NORMAL
PH, POC: 7.5
PROTEIN, POC: NORMAL
SPECIFIC GRAVITY, POC: 1.02
UROBILINOGEN, POC: 0.2

## 2024-05-03 RX ORDER — VENLAFAXINE HYDROCHLORIDE 75 MG/1
CAPSULE, EXTENDED RELEASE ORAL
Qty: 90 CAPSULE | Refills: 3 | Status: SHIPPED | OUTPATIENT
Start: 2024-05-03

## 2024-05-03 RX ORDER — FOLIC ACID 1 MG/1
1 TABLET ORAL
Qty: 40 TABLET | Refills: 3 | Status: SHIPPED | OUTPATIENT
Start: 2024-05-03

## 2024-05-03 RX ORDER — DAPAGLIFLOZIN 5 MG/1
5 TABLET, FILM COATED ORAL EVERY MORNING
Qty: 30 TABLET | Refills: 3 | Status: SHIPPED | OUTPATIENT
Start: 2024-05-03 | End: 2024-05-03

## 2024-05-03 SDOH — ECONOMIC STABILITY: INCOME INSECURITY: HOW HARD IS IT FOR YOU TO PAY FOR THE VERY BASICS LIKE FOOD, HOUSING, MEDICAL CARE, AND HEATING?: NOT HARD AT ALL

## 2024-05-03 SDOH — ECONOMIC STABILITY: HOUSING INSECURITY
IN THE LAST 12 MONTHS, WAS THERE A TIME WHEN YOU DID NOT HAVE A STEADY PLACE TO SLEEP OR SLEPT IN A SHELTER (INCLUDING NOW)?: NO

## 2024-05-03 SDOH — ECONOMIC STABILITY: FOOD INSECURITY: WITHIN THE PAST 12 MONTHS, YOU WORRIED THAT YOUR FOOD WOULD RUN OUT BEFORE YOU GOT MONEY TO BUY MORE.: NEVER TRUE

## 2024-05-03 SDOH — ECONOMIC STABILITY: FOOD INSECURITY: WITHIN THE PAST 12 MONTHS, THE FOOD YOU BOUGHT JUST DIDN'T LAST AND YOU DIDN'T HAVE MONEY TO GET MORE.: NEVER TRUE

## 2024-05-03 NOTE — PROGRESS NOTES
Visual inspection:  Deformity/amputation: absent  Skin lesions/pre-ulcerative calluses: absent  Edema: right- negative, left- negative    Sensory exam:  Monofilament sensation: normal  (minimum of 5 random plantar locations tested, avoiding callused areas - > 1 area with absence of sensation is + for neuropathy)    Pulses: normal,

## 2024-05-03 NOTE — PROGRESS NOTES
Chief Complaint:   Macie Bang is a 59 y.o. female who presents for complete physical examination    History of Present Illness:    Meds, vitamins and allergies reviewed with pt  Labs reviewed with pt in detail    Seeing Dr. Kaetrin Greene for her diabetes, on Ozempic    Health maintenance reviewed in detail    Offer PCV 20 with next visit  Urged yearly diabetic eye exam  Urged colonoscopy and mammogram    Labs reviewed with patient in detail    Wt Readings from Last 3 Encounters:   05/03/24 107 kg (236 lb)   02/14/23 96.6 kg (213 lb)   10/18/22 102.5 kg (226 lb)       Patient Active Problem List   Diagnosis    Internal hemorrhoid    Adjustment disorder with depressed mood    Vitamin D deficiency    Hyperlipidemia, mixed    Thyroid nodule    Primary insomnia    Multinodular goiter    Class 2 obesity with body mass index (BMI) of 39.0 to 39.9 in adult    Menopause    Alopecia    Class 2 severe obesity with serious comorbidity and body mass index (BMI) of 36.0 to 36.9 in adult (HCC)    Controlled type 2 diabetes mellitus without complication, without long-term current use of insulin (HCC)    Low folic acid     Past Medical History:   Diagnosis Date    Depression     Hyperlipidemia, mixed 4/11/2018    Internal hemorrhoid     New onset type 2 diabetes mellitus (HCC) 4/11/2018    Thyroid nodule        Past Surgical History:   Procedure Laterality Date    ENDOMETRIAL ABLATION         Current Outpatient Medications   Medication Sig Dispense Refill    venlafaxine (EFFEXOR XR) 75 MG extended release capsule TAKE 1 CAPSULE BY MOUTH EVERY DAY AT NIGHT 90 capsule 3    folic acid (FOLVITE) 1 MG tablet Take 1 tablet by mouth three times a week 40 tablet 3    hydrocortisone 2.5 % cream Apply topically 2 times daily. 56 g 1    QUEtiapine (SEROQUEL) 50 MG tablet TAKE 1 TABLET BY MOUTH EVERY DAY AT NIGHT 30 tablet 0    venlafaxine (EFFEXOR XR) 37.5 MG extended release capsule Take 1 capsule by mouth daily (with breakfast) 90

## 2024-05-04 LAB
CREAT UR-MCNC: 62.8 MG/DL (ref 28–259)
MICROALBUMIN UR DL<=1MG/L-MCNC: <1.2 MG/DL
MICROALBUMIN/CREAT UR: NORMAL MG/G (ref 0–30)

## 2024-05-10 ENCOUNTER — HOSPITAL ENCOUNTER (OUTPATIENT)
Dept: ULTRASOUND IMAGING | Age: 59
Discharge: HOME | End: 2024-05-10
Payer: COMMERCIAL

## 2024-05-10 DIAGNOSIS — D17.1 LIPOMA OF TORSO: ICD-10-CM

## 2024-05-10 PROCEDURE — 76999 ECHO EXAMINATION PROCEDURE: CPT

## 2024-05-20 RX ORDER — QUETIAPINE FUMARATE 50 MG/1
TABLET, FILM COATED ORAL
Qty: 90 TABLET | Refills: 1 | Status: SHIPPED | OUTPATIENT
Start: 2024-05-20

## 2024-05-20 NOTE — TELEPHONE ENCOUNTER
Lov 5/3/24  Lrf  30 0 4/23/24 Medication:   Requested Prescriptions     Pending Prescriptions Disp Refills    QUEtiapine (SEROQUEL) 50 MG tablet [Pharmacy Med Name: QUETIAPINE FUMARATE 50 MG TAB] 90 tablet 1     Sig: TAKE 1 TABLET BY MOUTH EVERY DAY AT NIGHT        Last Filled:      Patient Phone Number: [phone] (home)     Last appt: 5/3/2024   Next appt: 6/28/2024    Last OARRS:        No data to display

## 2024-08-26 DIAGNOSIS — E66.01 CLASS 2 SEVERE OBESITY DUE TO EXCESS CALORIES WITH SERIOUS COMORBIDITY AND BODY MASS INDEX (BMI) OF 36.0 TO 36.9 IN ADULT (HCC): Primary | ICD-10-CM

## 2024-10-09 ENCOUNTER — OFFICE VISIT (OUTPATIENT)
Dept: FAMILY MEDICINE CLINIC | Age: 59
End: 2024-10-09
Payer: COMMERCIAL

## 2024-10-09 VITALS
OXYGEN SATURATION: 98 % | WEIGHT: 218 LBS | DIASTOLIC BLOOD PRESSURE: 84 MMHG | HEART RATE: 106 BPM | BODY MASS INDEX: 36.28 KG/M2 | SYSTOLIC BLOOD PRESSURE: 121 MMHG

## 2024-10-09 DIAGNOSIS — E66.01 CLASS 2 SEVERE OBESITY DUE TO EXCESS CALORIES WITH SERIOUS COMORBIDITY AND BODY MASS INDEX (BMI) OF 36.0 TO 36.9 IN ADULT: Primary | ICD-10-CM

## 2024-10-09 DIAGNOSIS — E11.9 CONTROLLED TYPE 2 DIABETES MELLITUS WITHOUT COMPLICATION, WITHOUT LONG-TERM CURRENT USE OF INSULIN (HCC): ICD-10-CM

## 2024-10-09 DIAGNOSIS — E66.812 CLASS 2 SEVERE OBESITY DUE TO EXCESS CALORIES WITH SERIOUS COMORBIDITY AND BODY MASS INDEX (BMI) OF 36.0 TO 36.9 IN ADULT: Primary | ICD-10-CM

## 2024-10-09 DIAGNOSIS — E11.9 TYPE 2 DIABETES MELLITUS WITHOUT COMPLICATION, WITHOUT LONG-TERM CURRENT USE OF INSULIN (HCC): ICD-10-CM

## 2024-10-09 DIAGNOSIS — E78.2 HYPERLIPIDEMIA, MIXED: ICD-10-CM

## 2024-10-09 LAB — HBA1C MFR BLD: 5.6 %

## 2024-10-09 PROCEDURE — 2022F DILAT RTA XM EVC RTNOPTHY: CPT | Performed by: FAMILY MEDICINE

## 2024-10-09 PROCEDURE — 99213 OFFICE O/P EST LOW 20 MIN: CPT | Performed by: FAMILY MEDICINE

## 2024-10-09 PROCEDURE — G8417 CALC BMI ABV UP PARAM F/U: HCPCS | Performed by: FAMILY MEDICINE

## 2024-10-09 PROCEDURE — G8427 DOCREV CUR MEDS BY ELIG CLIN: HCPCS | Performed by: FAMILY MEDICINE

## 2024-10-09 PROCEDURE — 1036F TOBACCO NON-USER: CPT | Performed by: FAMILY MEDICINE

## 2024-10-09 PROCEDURE — 83036 HEMOGLOBIN GLYCOSYLATED A1C: CPT | Performed by: FAMILY MEDICINE

## 2024-10-09 PROCEDURE — 3044F HG A1C LEVEL LT 7.0%: CPT | Performed by: FAMILY MEDICINE

## 2024-10-09 PROCEDURE — 3017F COLORECTAL CA SCREEN DOC REV: CPT | Performed by: FAMILY MEDICINE

## 2024-10-09 PROCEDURE — G8484 FLU IMMUNIZE NO ADMIN: HCPCS | Performed by: FAMILY MEDICINE

## 2024-10-09 RX ORDER — ROSUVASTATIN CALCIUM 5 MG/1
5 TABLET, COATED ORAL NIGHTLY
Qty: 90 TABLET | Refills: 3 | Status: SHIPPED | OUTPATIENT
Start: 2024-10-09

## 2024-10-09 NOTE — PROGRESS NOTES
Macie Bang is a 59 y.o. female.    HPI:  Here for A1c and weight check on Ozempic, and 2 mg weekly  Tolerating Ozempic without difficulty    Meds, vitamins and allergies reviewed with pt    Wt Readings from Last 3 Encounters:   10/09/24 98.9 kg (218 lb)   05/03/24 107 kg (236 lb)   02/14/23 96.6 kg (213 lb)       REVIEW OF SYSTEMS:   CONSTITUTIONAL: See history of present illness,   Weight loss noted   HEENT: No new vision difficulties or ringing in the ears.   RESPIRATORY: No new SOB, PND, orthopnea or cough.   CARDIOVASCULAR: no CP, palpitations or SOB with exertion  GI: No nausea, vomiting, diarrhea, constipation, abdominal pain or changes in bowel habits.   : No urinary frequency, urgency, incontinence hematuria or dysuria.   SKIN: No cyanosis or skin lesions.   MUSCULOSKELETAL: No new muscle or joint pain.   NEUROLOGICAL: No syncope or TIA-like symptoms.   PSYCHIATRIC: No anxiety, insomnia or depression     No Known Allergies    Prior to Visit Medications    Medication Sig Taking? Authorizing Provider   semaglutide, 2 MG/DOSE, (OZEMPIC) 8 MG/3ML SOPN sc injection Inject 2 mg into the skin every 7 days Yes Adriane Rinaldi MD   QUEtiapine (SEROQUEL) 50 MG tablet TAKE 1 TABLET BY MOUTH EVERY DAY AT NIGHT Yes Ardiane Rinaldi MD   venlafaxine (EFFEXOR XR) 75 MG extended release capsule TAKE 1 CAPSULE BY MOUTH EVERY DAY AT NIGHT Yes Adriane Rinaldi MD   folic acid (FOLVITE) 1 MG tablet Take 1 tablet by mouth three times a week Yes Adriane Rinaldi MD   hydrocortisone 2.5 % cream Apply topically 2 times daily. Yes Adriane Rinaldi MD   venlafaxine (EFFEXOR XR) 37.5 MG extended release capsule Take 1 capsule by mouth daily (with breakfast) Yes Adriane Rinaldi MD   fluticasone (FLONASE) 50 MCG/ACT nasal spray SPRAY 1 SPRAY INTO EACH NOSTRIL EVERY DAY Yes Adriane Rinaldi MD   metFORMIN (GLUCOPHAGE-XR) 500 MG extended release tablet TAKE 1 TABLET BY MOUTH TWICE A DAY WITH MEALS Yes Adriane Rinaldi MD   rosuvastatin

## 2024-11-26 RX ORDER — QUETIAPINE FUMARATE 50 MG/1
TABLET, FILM COATED ORAL
Qty: 90 TABLET | Refills: 1 | Status: SHIPPED | OUTPATIENT
Start: 2024-11-26

## 2024-11-26 NOTE — TELEPHONE ENCOUNTER
Medication:   Requested Prescriptions     Pending Prescriptions Disp Refills    QUEtiapine (SEROQUEL) 50 MG tablet [Pharmacy Med Name: QUETIAPINE FUMARATE 50 MG TAB] 90 tablet 1     Sig: TAKE 1 TABLET BY MOUTH EVERY DAY AT NIGHT        Last Filled:      Patient Phone Number: [phone] (home)     Last appt: 10/9/2024   Next appt: Visit date not found    Last OARRS:        No data to display

## 2024-12-31 ENCOUNTER — TELEPHONE (OUTPATIENT)
Dept: FAMILY MEDICINE CLINIC | Age: 59
End: 2024-12-31

## 2024-12-31 NOTE — TELEPHONE ENCOUNTER
Patient requesting PAXLOVID     Patient tested positive for COVID today.  Patient tested herself twice.    Symptoms: Chills, Running Nose, Barking Cough, Head Ache, Congested.    [facility] - [place] [address] [address]    LOV 10-9-2024

## 2025-01-13 ENCOUNTER — TELEPHONE (OUTPATIENT)
Dept: FAMILY MEDICINE CLINIC | Age: 60
End: 2025-01-13

## 2025-01-13 DIAGNOSIS — E55.9 VITAMIN D DEFICIENCY: ICD-10-CM

## 2025-01-13 DIAGNOSIS — E11.9 CONTROLLED TYPE 2 DIABETES MELLITUS WITHOUT COMPLICATION, WITHOUT LONG-TERM CURRENT USE OF INSULIN (HCC): Primary | ICD-10-CM

## 2025-01-13 DIAGNOSIS — E78.2 HYPERLIPIDEMIA, MIXED: ICD-10-CM

## 2025-01-13 DIAGNOSIS — F43.21 ADJUSTMENT DISORDER WITH DEPRESSED MOOD: ICD-10-CM

## 2025-01-13 RX ORDER — VENLAFAXINE HYDROCHLORIDE 37.5 MG/1
37.5 CAPSULE, EXTENDED RELEASE ORAL
Qty: 90 CAPSULE | Refills: 1 | OUTPATIENT
Start: 2025-01-13

## 2025-01-13 RX ORDER — VENLAFAXINE HYDROCHLORIDE 75 MG/1
CAPSULE, EXTENDED RELEASE ORAL
Qty: 180 CAPSULE | Refills: 1 | Status: SHIPPED | OUTPATIENT
Start: 2025-01-13

## 2025-01-13 NOTE — TELEPHONE ENCOUNTER
Medication:   Requested Prescriptions     Pending Prescriptions Disp Refills    venlafaxine (EFFEXOR XR) 37.5 MG extended release capsule 90 capsule 1     Sig: Take 1 capsule by mouth daily (with breakfast)        Last Filled:  12/07/2023    Patient Phone Number: [phone] (home)     Last appt: 10/9/2024   Next appt: 5/5/2025    Last OARRS:        No data to display

## 2025-02-24 ENCOUNTER — E-VISIT (OUTPATIENT)
Dept: FAMILY MEDICINE CLINIC | Age: 60
End: 2025-02-24

## 2025-02-24 DIAGNOSIS — R35.0 URINARY FREQUENCY: Primary | ICD-10-CM

## 2025-02-25 RX ORDER — NITROFURANTOIN 25; 75 MG/1; MG/1
100 CAPSULE ORAL 2 TIMES DAILY
Qty: 14 CAPSULE | Refills: 0 | Status: SHIPPED | OUTPATIENT
Start: 2025-02-25 | End: 2025-03-04

## 2025-02-25 NOTE — PROGRESS NOTES
Questionnaire reviewed    Probable cystitis    Plan push fluids  Avoid soap in vaginal area  Macrobid twice a day for 5 to 7 days  Follow-up if symptoms persist or worsen or to emergency room    Dr. Rinaldi

## 2025-02-27 ENCOUNTER — TELEPHONE (OUTPATIENT)
Dept: FAMILY MEDICINE CLINIC | Age: 60
End: 2025-02-27

## 2025-02-27 NOTE — TELEPHONE ENCOUNTER
Appointment Request From: Macie Bang     With Provider: Dr. Adriane Rinaldi MD [[facility]]     Preferred Date Range: 2/28/2025 - 2/28/2028     Preferred Times: Any Time     Reason for visit: Office Visit     Comments:  Dr. Rinaldi requesting me to come in tomorrow.

## 2025-02-28 ENCOUNTER — TELEPHONE (OUTPATIENT)
Dept: FAMILY MEDICINE CLINIC | Age: 60
End: 2025-02-28

## 2025-02-28 ENCOUNTER — LAB (OUTPATIENT)
Dept: FAMILY MEDICINE CLINIC | Age: 60
End: 2025-02-28
Payer: COMMERCIAL

## 2025-02-28 DIAGNOSIS — R39.9 UTI SYMPTOMS: Primary | ICD-10-CM

## 2025-02-28 LAB
BILIRUBIN, POC: NORMAL
BLOOD URINE, POC: NORMAL
CLARITY, POC: CLEAR
COLOR, POC: NORMAL
GLUCOSE URINE, POC: NORMAL MG/DL
KETONES, POC: NORMAL MG/DL
LEUKOCYTE EST, POC: NORMAL
NITRITE, POC: NORMAL
PH, POC: 5.5
PROTEIN, POC: 30 MG/DL
SPECIFIC GRAVITY, POC: 1.02
UROBILINOGEN, POC: 0.2 MG/DL

## 2025-02-28 PROCEDURE — 81002 URINALYSIS NONAUTO W/O SCOPE: CPT | Performed by: FAMILY MEDICINE

## 2025-03-02 LAB — BACTERIA UR CULT: NORMAL

## 2025-03-07 DIAGNOSIS — E11.9 TYPE 2 DIABETES MELLITUS WITHOUT COMPLICATIONS (HCC): ICD-10-CM

## 2025-03-07 LAB
ALBUMIN: 4.3 G/DL (ref 3.8–4.9)
ALP BLD-CCNC: 108 IU/L (ref 44–121)
ALT SERPL-CCNC: 11 IU/L (ref 0–32)
AST SERPL-CCNC: 12 IU/L (ref 0–40)
BASOPHILS ABSOLUTE: 0 X10E3/UL (ref 0–0.2)
BASOPHILS RELATIVE PERCENT: 1 %
BILIRUB SERPL-MCNC: 0.3 MG/DL (ref 0–1.2)
BUN / CREAT RATIO: 18 (ref 9–23)
BUN BLDV-MCNC: 15 MG/DL (ref 6–24)
CALCIUM SERPL-MCNC: 9 MG/DL (ref 8.7–10.2)
CHLORIDE BLD-SCNC: 100 MMOL/L (ref 96–106)
CHOLESTEROL, TOTAL: 258 MG/DL (ref 100–199)
CO2: 23 MMOL/L (ref 20–29)
CREAT SERPL-MCNC: 0.84 MG/DL (ref 0.57–1)
EOSINOPHILS ABSOLUTE: 0.1 X10E3/UL (ref 0–0.4)
EOSINOPHILS RELATIVE PERCENT: 2 %
ESTIMATED GLOMERULAR FILTRATION RATE CREATININE EQUATION: 81 ML/MIN/1.73
FOLATE: 7.7 NG/ML
GLOBULIN: 2.2 G/DL (ref 1.5–4.5)
GLUCOSE BLD-MCNC: 105 MG/DL (ref 70–99)
HBA1C MFR BLD: 6 % (ref 4.8–5.6)
HCT VFR BLD CALC: 40 % (ref 34–46.6)
HDLC SERPL-MCNC: 48 MG/DL
HEMOGLOBIN: 13 G/DL (ref 11.1–15.9)
IMMATURE GRANS (ABS): 0 X10E3/UL (ref 0–0.1)
IMMATURE GRANULOCYTES %: 0 %
LDL CHOLESTEROL: 177 MG/DL (ref 0–99)
LYMPHOCYTES ABSOLUTE: 1.6 X10E3/UL (ref 0.7–3.1)
LYMPHOCYTES RELATIVE PERCENT: 30 %
MCH RBC QN AUTO: 28 PG (ref 26.6–33)
MCHC RBC AUTO-ENTMCNC: 32.5 G/DL (ref 31.5–35.7)
MCV RBC AUTO: 86 FL (ref 79–97)
MONOCYTES ABSOLUTE: 0.3 X10E3/UL (ref 0.1–0.9)
MONOCYTES RELATIVE PERCENT: 6 %
NEUTROPHILS ABSOLUTE: 3.3 X10E3/UL (ref 1.4–7)
NEUTROPHILS RELATIVE PERCENT: 61 %
PDW BLD-RTO: 13.3 % (ref 11.7–15.4)
PLATELET # BLD: 326 X10E3/UL (ref 150–450)
POTASSIUM SERPL-SCNC: 4.1 MMOL/L (ref 3.5–5.2)
RBC # BLD: 4.65 X10E6/UL (ref 3.77–5.28)
SODIUM BLD-SCNC: 136 MMOL/L (ref 134–144)
TOTAL PROTEIN: 6.5 G/DL (ref 6–8.5)
TRIGL SERPL-MCNC: 177 MG/DL (ref 0–149)
TSH SERPL DL<=0.05 MIU/L-ACNC: 1.58 UIU/ML (ref 0.45–4.5)
VITAMIN B-12: 479 PG/ML (ref 232–1245)
VLDLC SERPL CALC-MCNC: 33 MG/DL (ref 5–40)
WBC # BLD: 5.4 X10E3/UL (ref 3.4–10.8)

## 2025-03-07 RX ORDER — METFORMIN HYDROCHLORIDE 500 MG/1
500 TABLET, EXTENDED RELEASE ORAL 2 TIMES DAILY WITH MEALS
Qty: 180 TABLET | Refills: 3 | Status: SHIPPED | OUTPATIENT
Start: 2025-03-07

## 2025-03-12 ENCOUNTER — RESULTS FOLLOW-UP (OUTPATIENT)
Dept: FAMILY MEDICINE CLINIC | Age: 60
End: 2025-03-12

## 2025-03-19 PROBLEM — Z80.49 FAMILY HISTORY OF UTERINE CANCER: Status: ACTIVE | Noted: 2025-03-19

## 2025-03-19 PROBLEM — R10.2 PELVIC PAIN: Status: ACTIVE | Noted: 2025-03-19

## 2025-03-19 PROBLEM — Z98.890 HISTORY OF ENDOMETRIAL ABLATION: Status: ACTIVE | Noted: 2025-03-19

## 2025-03-19 PROBLEM — Z84.2 FAMILY HISTORY OF OVARIAN CYST: Status: ACTIVE | Noted: 2025-03-19

## 2025-03-19 PROBLEM — Z79.890 ON POSTMENOPAUSAL HORMONE REPLACEMENT THERAPY: Status: ACTIVE | Noted: 2025-03-19

## 2025-03-21 DIAGNOSIS — E66.01 CLASS 2 SEVERE OBESITY DUE TO EXCESS CALORIES WITH SERIOUS COMORBIDITY AND BODY MASS INDEX (BMI) OF 36.0 TO 36.9 IN ADULT: ICD-10-CM

## 2025-03-21 DIAGNOSIS — E66.812 CLASS 2 SEVERE OBESITY DUE TO EXCESS CALORIES WITH SERIOUS COMORBIDITY AND BODY MASS INDEX (BMI) OF 36.0 TO 36.9 IN ADULT: ICD-10-CM

## 2025-03-21 RX ORDER — SEMAGLUTIDE 2.68 MG/ML
INJECTION, SOLUTION SUBCUTANEOUS
Qty: 3 ML | Refills: 2 | Status: SHIPPED | OUTPATIENT
Start: 2025-03-21

## 2025-03-24 ENCOUNTER — RESULTS FOLLOW-UP (OUTPATIENT)
Dept: OBGYN CLINIC | Age: 60
End: 2025-03-24

## 2025-04-21 ENCOUNTER — OFFICE VISIT (OUTPATIENT)
Dept: OBGYN CLINIC | Age: 60
End: 2025-04-21
Payer: COMMERCIAL

## 2025-04-21 VITALS
OXYGEN SATURATION: 97 % | SYSTOLIC BLOOD PRESSURE: 137 MMHG | BODY MASS INDEX: 36.31 KG/M2 | WEIGHT: 218.2 LBS | DIASTOLIC BLOOD PRESSURE: 78 MMHG | HEART RATE: 98 BPM

## 2025-04-21 DIAGNOSIS — Z98.890 HISTORY OF ENDOMETRIAL ABLATION: ICD-10-CM

## 2025-04-21 DIAGNOSIS — D25.0 INTRAMURAL AND SUBMUCOUS LEIOMYOMA OF UTERUS: ICD-10-CM

## 2025-04-21 DIAGNOSIS — M54.50 MIDLINE LOW BACK PAIN, UNSPECIFIED CHRONICITY, UNSPECIFIED WHETHER SCIATICA PRESENT: ICD-10-CM

## 2025-04-21 DIAGNOSIS — Z09 FOLLOW-UP EXAM: Primary | ICD-10-CM

## 2025-04-21 DIAGNOSIS — D25.1 INTRAMURAL AND SUBMUCOUS LEIOMYOMA OF UTERUS: ICD-10-CM

## 2025-04-21 PROCEDURE — G8417 CALC BMI ABV UP PARAM F/U: HCPCS | Performed by: OBSTETRICS & GYNECOLOGY

## 2025-04-21 PROCEDURE — 1036F TOBACCO NON-USER: CPT | Performed by: OBSTETRICS & GYNECOLOGY

## 2025-04-21 PROCEDURE — 3017F COLORECTAL CA SCREEN DOC REV: CPT | Performed by: OBSTETRICS & GYNECOLOGY

## 2025-04-21 PROCEDURE — G8427 DOCREV CUR MEDS BY ELIG CLIN: HCPCS | Performed by: OBSTETRICS & GYNECOLOGY

## 2025-04-21 PROCEDURE — 99213 OFFICE O/P EST LOW 20 MIN: CPT | Performed by: OBSTETRICS & GYNECOLOGY

## 2025-04-21 SDOH — ECONOMIC STABILITY: TRANSPORTATION INSECURITY
IN THE PAST 12 MONTHS, HAS THE LACK OF TRANSPORTATION KEPT YOU FROM MEDICAL APPOINTMENTS OR FROM GETTING MEDICATIONS?: NO

## 2025-04-21 SDOH — ECONOMIC STABILITY: INCOME INSECURITY: IN THE LAST 12 MONTHS, WAS THERE A TIME WHEN YOU WERE NOT ABLE TO PAY THE MORTGAGE OR RENT ON TIME?: NO

## 2025-04-21 SDOH — ECONOMIC STABILITY: FOOD INSECURITY: WITHIN THE PAST 12 MONTHS, YOU WORRIED THAT YOUR FOOD WOULD RUN OUT BEFORE YOU GOT MONEY TO BUY MORE.: NEVER TRUE

## 2025-04-21 SDOH — ECONOMIC STABILITY: FOOD INSECURITY: WITHIN THE PAST 12 MONTHS, THE FOOD YOU BOUGHT JUST DIDN'T LAST AND YOU DIDN'T HAVE MONEY TO GET MORE.: NEVER TRUE

## 2025-04-21 SDOH — ECONOMIC STABILITY: TRANSPORTATION INSECURITY
IN THE PAST 12 MONTHS, HAS LACK OF TRANSPORTATION KEPT YOU FROM MEETINGS, WORK, OR FROM GETTING THINGS NEEDED FOR DAILY LIVING?: NO

## 2025-04-21 NOTE — PROGRESS NOTES
[facility]   Return Gyn Office Visit    CC:   Chief Complaint   Patient presents with    Follow-up       HPI:  59 y.o. who presents to [facility] for FU exam due to back pain / pelvic pain.   Last seen in March for annual exam.   Ablation 14 yrs ago -- no bleeding since   Last 4 weeks she has had pelvic pain, lower on both sides, back pain, \"pulling on left leg\"    Urine was clear, estrogen helping with dysuria     On a cream + patch + pill } sleeping better / HF improved / energy improved } Vivelle dot / Prometrium / vaginal estrogen } 3rd week on HRT   Denies GYN surgeries otherwise   Mother had uterine / ? Pre ovarian cancer   Here today for US.     Review of Systems - The following ROS was otherwise negative, except as noted in the HPI: constitutional, respiratory, cardiovascular, gastrointestinal, genitourinary    Objective:  /78 (BP Site: Right Upper Arm, Patient Position: Sitting, BP Cuff Size: Large Adult)   Pulse 98   Wt 99 kg (218 lb 3.2 oz)   SpO2 97%   BMI 36.31 kg/m²   General: Alert, well appearing, no acute distress   Resp effort within normal limits   Abdomen: Soft, nontender, nondistended   Pelvic: Deferred    Skin: No visible lesions / rashes / concerning nevus   Extremities: No redness or tenderness, neg Julia's sign  Osteopathic: no TART changes    PELVIC ULTRASOUND without DOPPLER INTERROGATION   NON OB     DATE: 4/21/25     PHYSICIAN: FAUZIA Rajan D.O.      SONOGRAPHER:  HAZEL Calderon     INDICATION: pelvic pain     TYPE OF SCAN: vaginal     FINDINGS:    The cul de sac is normal. No free fluid appreciated.  The cervix is normal and not enlarged.  Nabothian cyst/s is noted within the uterine cervix.  The uterus measures 5.78 cm x 3.83 cm x 4.5 cm.    The uterus is axial/mid.  The endometrium measures 3.37 mm. Limited evaluation of endometrium due to hx ablation.  The myometrium is heterogeneous in appearance. Three focal heterogeneous masses noted throughout myometrium

## 2025-05-04 NOTE — PROGRESS NOTES
Chief Complaint:   Macie Bang is a 60 y.o. female who presents for complete physical examination, diabetic check, med check    History of Present Illness:    Meds, vitamins and allergies reviewed with pt  Labs from 3/2025 reviewed with pt    Family history of A-fib, her dad had ablation  Her mom also has A-fib    Heart rate is elevated today, check EKG    Wt Readings from Last 3 Encounters:   05/05/25 97.5 kg (215 lb)   04/21/25 99 kg (218 lb 3.2 oz)   03/19/25 98.4 kg (217 lb)       Patient Active Problem List   Diagnosis    Internal hemorrhoid    Adjustment disorder with depressed mood    Vitamin D deficiency    Hyperlipidemia, mixed    Thyroid nodule    Primary insomnia    Multinodular goiter    Class 2 obesity with body mass index (BMI) of 39.0 to 39.9 in adult    Menopause    Alopecia    Class 2 severe obesity with serious comorbidity and body mass index (BMI) of 36.0 to 36.9 in adult (HCC)    Controlled type 2 diabetes mellitus without complication, without long-term current use of insulin (HCC)    Low folic acid    Family history of ovarian cyst    Family history of uterine cancer    Pelvic pain    History of endometrial ablation    On postmenopausal hormone replacement therapy    Intramural and submucous leiomyoma of uterus    Midline low back pain     Past Medical History:   Diagnosis Date    Depression     Hyperlipidemia, mixed 4/11/2018    Internal hemorrhoid     New onset type 2 diabetes mellitus (HCC) 4/11/2018    Thyroid nodule        Past Surgical History:   Procedure Laterality Date    ENDOMETRIAL ABLATION         Current Outpatient Medications   Medication Sig Dispense Refill    aspirin 81 MG chewable tablet Take 1 tablet by mouth nightly      folic acid (FOLVITE) 1 MG tablet Take 1 tablet by mouth three times a week 40 tablet 3    OZEMPIC, 2 MG/DOSE, 8 MG/3ML SOPN sc injection INJECT 2 MG INTO THE SKIN EVERY 7 DAYS 3 mL 2    estradiol (VIVELLE) 0.0375 MG/24HR APPLY 1 PATCH TWICE A WEEK

## 2025-05-05 ENCOUNTER — OFFICE VISIT (OUTPATIENT)
Dept: FAMILY MEDICINE CLINIC | Age: 60
End: 2025-05-05
Payer: COMMERCIAL

## 2025-05-05 VITALS
OXYGEN SATURATION: 97 % | BODY MASS INDEX: 35.82 KG/M2 | SYSTOLIC BLOOD PRESSURE: 90 MMHG | WEIGHT: 215 LBS | HEART RATE: 112 BPM | HEIGHT: 65 IN | DIASTOLIC BLOOD PRESSURE: 59 MMHG

## 2025-05-05 DIAGNOSIS — R06.02 SHORTNESS OF BREATH: ICD-10-CM

## 2025-05-05 DIAGNOSIS — E55.9 VITAMIN D DEFICIENCY: ICD-10-CM

## 2025-05-05 DIAGNOSIS — E78.00 ELEVATED LDL CHOLESTEROL LEVEL: ICD-10-CM

## 2025-05-05 DIAGNOSIS — F43.21 ADJUSTMENT DISORDER WITH DEPRESSED MOOD: ICD-10-CM

## 2025-05-05 DIAGNOSIS — Z12.31 BREAST CANCER SCREENING BY MAMMOGRAM: ICD-10-CM

## 2025-05-05 DIAGNOSIS — R00.0 TACHYCARDIA: ICD-10-CM

## 2025-05-05 DIAGNOSIS — Z00.00 ANNUAL PHYSICAL EXAM: Primary | ICD-10-CM

## 2025-05-05 DIAGNOSIS — E53.8 LOW FOLIC ACID: ICD-10-CM

## 2025-05-05 DIAGNOSIS — F51.01 PRIMARY INSOMNIA: ICD-10-CM

## 2025-05-05 DIAGNOSIS — E11.9 CONTROLLED TYPE 2 DIABETES MELLITUS WITHOUT COMPLICATION, WITHOUT LONG-TERM CURRENT USE OF INSULIN (HCC): ICD-10-CM

## 2025-05-05 LAB
BILIRUBIN, POC: NORMAL
BLOOD URINE, POC: NORMAL
CLARITY, POC: CLEAR
COLOR, POC: YELLOW
CREAT UR-MCNC: 357 MG/DL (ref 28–259)
GLUCOSE URINE, POC: NORMAL MG/DL
KETONES, POC: NORMAL MG/DL
LEUKOCYTE EST, POC: NORMAL
MICROALBUMIN UR DL<=1MG/L-MCNC: 1.6 MG/DL
MICROALBUMIN/CREAT UR: 4.5 MG/G (ref 0–30)
NITRITE, POC: NORMAL
PH, POC: 5
PROTEIN, POC: 30 MG/DL
SPECIFIC GRAVITY, POC: 1.03
UROBILINOGEN, POC: 0.2 MG/DL

## 2025-05-05 PROCEDURE — 1036F TOBACCO NON-USER: CPT | Performed by: FAMILY MEDICINE

## 2025-05-05 PROCEDURE — 81002 URINALYSIS NONAUTO W/O SCOPE: CPT | Performed by: FAMILY MEDICINE

## 2025-05-05 PROCEDURE — G8417 CALC BMI ABV UP PARAM F/U: HCPCS | Performed by: FAMILY MEDICINE

## 2025-05-05 PROCEDURE — 3044F HG A1C LEVEL LT 7.0%: CPT | Performed by: FAMILY MEDICINE

## 2025-05-05 PROCEDURE — 99396 PREV VISIT EST AGE 40-64: CPT | Performed by: FAMILY MEDICINE

## 2025-05-05 PROCEDURE — 99213 OFFICE O/P EST LOW 20 MIN: CPT | Performed by: FAMILY MEDICINE

## 2025-05-05 PROCEDURE — 3017F COLORECTAL CA SCREEN DOC REV: CPT | Performed by: FAMILY MEDICINE

## 2025-05-05 PROCEDURE — 93000 ELECTROCARDIOGRAM COMPLETE: CPT | Performed by: FAMILY MEDICINE

## 2025-05-05 PROCEDURE — G8427 DOCREV CUR MEDS BY ELIG CLIN: HCPCS | Performed by: FAMILY MEDICINE

## 2025-05-05 PROCEDURE — 2022F DILAT RTA XM EVC RTNOPTHY: CPT | Performed by: FAMILY MEDICINE

## 2025-05-05 RX ORDER — ASPIRIN 81 MG/1
81 TABLET, CHEWABLE ORAL NIGHTLY
COMMUNITY

## 2025-05-05 RX ORDER — FOLIC ACID 1 MG/1
1 TABLET ORAL
Qty: 40 TABLET | Refills: 3 | Status: SHIPPED | OUTPATIENT
Start: 2025-05-05

## 2025-05-16 ENCOUNTER — HOSPITAL ENCOUNTER (OUTPATIENT)
Dept: WOMENS IMAGING | Age: 60
Discharge: HOME | End: 2025-05-16
Payer: COMMERCIAL

## 2025-05-16 VITALS — BODY MASS INDEX: 35.82 KG/M2 | HEIGHT: 65 IN | WEIGHT: 215 LBS

## 2025-05-16 DIAGNOSIS — Z12.31 BREAST CANCER SCREENING BY MAMMOGRAM: ICD-10-CM

## 2025-05-16 PROCEDURE — 77063 BREAST TOMOSYNTHESIS BI: CPT

## 2025-05-19 ENCOUNTER — HOSPITAL ENCOUNTER (OUTPATIENT)
Age: 60
Discharge: HOME | End: 2025-05-21
Payer: COMMERCIAL

## 2025-05-19 ENCOUNTER — RESULTS FOLLOW-UP (OUTPATIENT)
Dept: FAMILY MEDICINE CLINIC | Age: 60
End: 2025-05-19

## 2025-05-19 ENCOUNTER — HOSPITAL ENCOUNTER (OUTPATIENT)
Dept: CT IMAGING | Age: 60
Discharge: HOME | End: 2025-05-19
Payer: COMMERCIAL

## 2025-05-19 DIAGNOSIS — E11.9 CONTROLLED TYPE 2 DIABETES MELLITUS WITHOUT COMPLICATION, WITHOUT LONG-TERM CURRENT USE OF INSULIN (HCC): ICD-10-CM

## 2025-05-19 DIAGNOSIS — R06.02 SHORTNESS OF BREATH: ICD-10-CM

## 2025-05-19 DIAGNOSIS — E78.00 ELEVATED LDL CHOLESTEROL LEVEL: ICD-10-CM

## 2025-05-19 DIAGNOSIS — R00.0 TACHYCARDIA: ICD-10-CM

## 2025-05-19 PROCEDURE — 75571 CT HRT W/O DYE W/CA TEST: CPT

## 2025-05-19 RX ORDER — DOBUTAMINE HYDROCHLORIDE 200 MG/100ML
2.5-1 INJECTION INTRAVENOUS ONCE
Status: DISCONTINUED | OUTPATIENT
Start: 2025-05-19 | End: 2025-05-22 | Stop reason: HOSPADM

## 2025-05-20 ENCOUNTER — RESULTS FOLLOW-UP (OUTPATIENT)
Dept: FAMILY MEDICINE CLINIC | Age: 60
End: 2025-05-20

## 2025-05-22 RX ORDER — QUETIAPINE FUMARATE 50 MG/1
50 TABLET, FILM COATED ORAL NIGHTLY
Qty: 90 TABLET | Refills: 1 | Status: SHIPPED | OUTPATIENT
Start: 2025-05-22

## 2025-06-22 DIAGNOSIS — E66.812 CLASS 2 SEVERE OBESITY DUE TO EXCESS CALORIES WITH SERIOUS COMORBIDITY AND BODY MASS INDEX (BMI) OF 36.0 TO 36.9 IN ADULT (HCC): ICD-10-CM

## 2025-06-22 DIAGNOSIS — E66.01 CLASS 2 SEVERE OBESITY DUE TO EXCESS CALORIES WITH SERIOUS COMORBIDITY AND BODY MASS INDEX (BMI) OF 36.0 TO 36.9 IN ADULT (HCC): ICD-10-CM

## 2025-06-25 DIAGNOSIS — E66.812 CLASS 2 SEVERE OBESITY DUE TO EXCESS CALORIES WITH SERIOUS COMORBIDITY AND BODY MASS INDEX (BMI) OF 36.0 TO 36.9 IN ADULT (HCC): ICD-10-CM

## 2025-06-25 DIAGNOSIS — E66.01 CLASS 2 SEVERE OBESITY DUE TO EXCESS CALORIES WITH SERIOUS COMORBIDITY AND BODY MASS INDEX (BMI) OF 36.0 TO 36.9 IN ADULT (HCC): ICD-10-CM

## 2025-06-25 RX ORDER — SEMAGLUTIDE 2.68 MG/ML
2 INJECTION, SOLUTION SUBCUTANEOUS
Qty: 3 ML | Refills: 1 | Status: SHIPPED | OUTPATIENT
Start: 2025-06-25

## 2025-06-25 RX ORDER — SEMAGLUTIDE 2.68 MG/ML
INJECTION, SOLUTION SUBCUTANEOUS
Qty: 3 ML | Refills: 2 | Status: SHIPPED | OUTPATIENT
Start: 2025-06-25 | End: 2025-06-25 | Stop reason: SDUPTHER

## 2025-07-17 NOTE — PROGRESS NOTES
Macie Bang is a 60 y.o. female.    HPI:  Here for diabetic, lipid, weight, med check    Wishes to continue semaglutide 2 mg weekly  Helping her, working  Her hunger gets out of control without it  Weight down 2 more pounds  Trying to eat healthy    Meds, vitamins and allergies reviewed with pt    Wt Readings from Last 3 Encounters:   07/18/25 96.6 kg (213 lb)   05/16/25 97.5 kg (215 lb)   05/05/25 97.5 kg (215 lb)       REVIEW OF SYSTEMS:   CONSTITUTIONAL: See history of present illness,   Weight noted   HEENT: No new vision difficulties or ringing in the ears.   RESPIRATORY: No new SOB, PND, orthopnea or cough.   CARDIOVASCULAR: no CP, palpitations or SOB with exertion  GI: No nausea, vomiting, diarrhea, constipation, abdominal pain or changes in bowel habits.   : No urinary frequency, urgency, incontinence hematuria or dysuria.   SKIN: No cyanosis or skin lesions.   MUSCULOSKELETAL: No new muscle or joint pain.   NEUROLOGICAL: No syncope or TIA-like symptoms.   PSYCHIATRIC: No anxiety, insomnia or depression     No Known Allergies    Prior to Visit Medications    Medication Sig Taking? Authorizing Provider   semaglutide, 2 MG/DOSE, (OZEMPIC, 2 MG/DOSE,) 8 MG/3ML SOPN sc injection Inject 2 mg into the skin every 7 days Yes Adriane Rinaldi MD   venlafaxine (EFFEXOR XR) 75 MG extended release capsule TAKE 1 CAPSULE BY MOUTH EVERY twice a day Yes Adriane Rinaldi MD   QUEtiapine (SEROQUEL) 50 MG tablet TAKE 1 TABLET BY MOUTH EVERY DAY AT NIGHT Yes Adriane Rinaldi MD   aspirin 81 MG chewable tablet Take 1 tablet by mouth nightly Yes Jose Blanton MD   folic acid (FOLVITE) 1 MG tablet Take 1 tablet by mouth three times a week Yes Adriane Rinaldi MD   estradiol (VIVELLE) 0.0375 MG/24HR APPLY 1 PATCH TWICE A WEEK TRANSDERMALLY FOR 28 DAYS Yes Jose Blanton MD   estradiol (ESTRACE) 0.1 MG/GM vaginal cream 1 GRAM VAGINALLY NIGHTLY X 2 WEEKS, THEN TWICE WEEKLY Yes Jose Blanton MD   progesterone

## 2025-07-18 ENCOUNTER — OFFICE VISIT (OUTPATIENT)
Dept: FAMILY MEDICINE CLINIC | Age: 60
End: 2025-07-18
Payer: COMMERCIAL

## 2025-07-18 VITALS
HEART RATE: 65 BPM | WEIGHT: 213 LBS | OXYGEN SATURATION: 97 % | BODY MASS INDEX: 35.45 KG/M2 | DIASTOLIC BLOOD PRESSURE: 70 MMHG | SYSTOLIC BLOOD PRESSURE: 100 MMHG

## 2025-07-18 DIAGNOSIS — E66.01 CLASS 2 SEVERE OBESITY DUE TO EXCESS CALORIES WITH SERIOUS COMORBIDITY AND BODY MASS INDEX (BMI) OF 36.0 TO 36.9 IN ADULT (HCC): ICD-10-CM

## 2025-07-18 DIAGNOSIS — E11.9 CONTROLLED TYPE 2 DIABETES MELLITUS WITHOUT COMPLICATION, WITHOUT LONG-TERM CURRENT USE OF INSULIN (HCC): Primary | ICD-10-CM

## 2025-07-18 DIAGNOSIS — F43.21 ADJUSTMENT DISORDER WITH DEPRESSED MOOD: ICD-10-CM

## 2025-07-18 DIAGNOSIS — E66.812 CLASS 2 SEVERE OBESITY DUE TO EXCESS CALORIES WITH SERIOUS COMORBIDITY AND BODY MASS INDEX (BMI) OF 36.0 TO 36.9 IN ADULT (HCC): ICD-10-CM

## 2025-07-18 LAB
CHOLEST SERPL-MCNC: 177 MG/DL (ref 0–199)
EST. AVERAGE GLUCOSE BLD GHB EST-MCNC: 114 MG/DL
FOLATE SERPL-MCNC: 15.3 NG/ML (ref 4.78–24.2)
HBA1C MFR BLD: 5.6 %
HDLC SERPL-MCNC: 49 MG/DL (ref 40–60)
LDL CHOLESTEROL: 83 MG/DL
TRIGL SERPL-MCNC: 226 MG/DL (ref 0–150)
VIT B12 SERPL-MCNC: 401 PG/ML (ref 211–911)
VLDLC SERPL CALC-MCNC: 45 MG/DL

## 2025-07-18 PROCEDURE — G8417 CALC BMI ABV UP PARAM F/U: HCPCS | Performed by: FAMILY MEDICINE

## 2025-07-18 PROCEDURE — G8427 DOCREV CUR MEDS BY ELIG CLIN: HCPCS | Performed by: FAMILY MEDICINE

## 2025-07-18 PROCEDURE — 36415 COLL VENOUS BLD VENIPUNCTURE: CPT | Performed by: FAMILY MEDICINE

## 2025-07-18 PROCEDURE — 3017F COLORECTAL CA SCREEN DOC REV: CPT | Performed by: FAMILY MEDICINE

## 2025-07-18 PROCEDURE — 2022F DILAT RTA XM EVC RTNOPTHY: CPT | Performed by: FAMILY MEDICINE

## 2025-07-18 PROCEDURE — 99214 OFFICE O/P EST MOD 30 MIN: CPT | Performed by: FAMILY MEDICINE

## 2025-07-18 PROCEDURE — 1036F TOBACCO NON-USER: CPT | Performed by: FAMILY MEDICINE

## 2025-07-18 PROCEDURE — 3044F HG A1C LEVEL LT 7.0%: CPT | Performed by: FAMILY MEDICINE

## 2025-07-18 RX ORDER — VENLAFAXINE HYDROCHLORIDE 75 MG/1
CAPSULE, EXTENDED RELEASE ORAL
Qty: 180 CAPSULE | Refills: 3 | Status: SHIPPED | OUTPATIENT
Start: 2025-07-18

## 2025-07-18 RX ORDER — SEMAGLUTIDE 2.68 MG/ML
2 INJECTION, SOLUTION SUBCUTANEOUS
Qty: 3 ML | Refills: 5 | Status: SHIPPED | OUTPATIENT
Start: 2025-07-18